# Patient Record
Sex: MALE | Race: NATIVE HAWAIIAN OR OTHER PACIFIC ISLANDER | NOT HISPANIC OR LATINO | URBAN - METROPOLITAN AREA
[De-identification: names, ages, dates, MRNs, and addresses within clinical notes are randomized per-mention and may not be internally consistent; named-entity substitution may affect disease eponyms.]

---

## 2019-01-29 ENCOUNTER — EMERGENCY (EMERGENCY)
Facility: HOSPITAL | Age: 64
LOS: 1 days | Discharge: HOME | End: 2019-01-29
Attending: EMERGENCY MEDICINE | Admitting: EMERGENCY MEDICINE
Payer: MEDICARE

## 2019-01-29 VITALS
TEMPERATURE: 98 F | DIASTOLIC BLOOD PRESSURE: 87 MMHG | HEART RATE: 88 BPM | RESPIRATION RATE: 18 BRPM | OXYGEN SATURATION: 95 % | SYSTOLIC BLOOD PRESSURE: 135 MMHG

## 2019-01-29 VITALS
SYSTOLIC BLOOD PRESSURE: 150 MMHG | TEMPERATURE: 97 F | DIASTOLIC BLOOD PRESSURE: 73 MMHG | RESPIRATION RATE: 20 BRPM | WEIGHT: 175.05 LBS | HEART RATE: 93 BPM | OXYGEN SATURATION: 94 %

## 2019-01-29 DIAGNOSIS — M79.89 OTHER SPECIFIED SOFT TISSUE DISORDERS: ICD-10-CM

## 2019-01-29 DIAGNOSIS — Z88.8 ALLERGY STATUS TO OTHER DRUGS, MEDICAMENTS AND BIOLOGICAL SUBSTANCES: ICD-10-CM

## 2019-01-29 DIAGNOSIS — J44.9 CHRONIC OBSTRUCTIVE PULMONARY DISEASE, UNSPECIFIED: ICD-10-CM

## 2019-01-29 DIAGNOSIS — Z87.891 PERSONAL HISTORY OF NICOTINE DEPENDENCE: ICD-10-CM

## 2019-01-29 DIAGNOSIS — R60.0 LOCALIZED EDEMA: ICD-10-CM

## 2019-01-29 LAB
ANION GAP SERPL CALC-SCNC: 10 MMOL/L — SIGNIFICANT CHANGE UP (ref 5–17)
APTT BLD: 32.8 SEC — SIGNIFICANT CHANGE UP (ref 27.5–36.3)
BASOPHILS NFR BLD AUTO: 0.6 % — SIGNIFICANT CHANGE UP (ref 0–2)
BUN SERPL-MCNC: 21 MG/DL — SIGNIFICANT CHANGE UP (ref 7–23)
CALCIUM SERPL-MCNC: 9.3 MG/DL — SIGNIFICANT CHANGE UP (ref 8.4–10.5)
CHLORIDE SERPL-SCNC: 109 MMOL/L — HIGH (ref 96–108)
CO2 SERPL-SCNC: 23 MMOL/L — SIGNIFICANT CHANGE UP (ref 22–31)
CREAT SERPL-MCNC: 0.83 MG/DL — SIGNIFICANT CHANGE UP (ref 0.5–1.3)
EOSINOPHIL NFR BLD AUTO: 2.4 % — SIGNIFICANT CHANGE UP (ref 0–6)
GLUCOSE SERPL-MCNC: 109 MG/DL — HIGH (ref 70–99)
HCT VFR BLD CALC: 34.6 % — LOW (ref 39–50)
HGB BLD-MCNC: 10.4 G/DL — LOW (ref 13–17)
INR BLD: 0.96 — SIGNIFICANT CHANGE UP (ref 0.88–1.16)
LYMPHOCYTES # BLD AUTO: 23 % — SIGNIFICANT CHANGE UP (ref 13–44)
MCHC RBC-ENTMCNC: 22 PG — LOW (ref 27–34)
MCHC RBC-ENTMCNC: 30.1 G/DL — LOW (ref 32–36)
MCV RBC AUTO: 73.2 FL — LOW (ref 80–100)
MONOCYTES NFR BLD AUTO: 8.4 % — SIGNIFICANT CHANGE UP (ref 2–14)
NEUTROPHILS NFR BLD AUTO: 65.6 % — SIGNIFICANT CHANGE UP (ref 43–77)
PLATELET # BLD AUTO: 310 K/UL — SIGNIFICANT CHANGE UP (ref 150–400)
POTASSIUM SERPL-MCNC: 4.6 MMOL/L — SIGNIFICANT CHANGE UP (ref 3.5–5.3)
POTASSIUM SERPL-SCNC: 4.6 MMOL/L — SIGNIFICANT CHANGE UP (ref 3.5–5.3)
PROTHROM AB SERPL-ACNC: 10.8 SEC — SIGNIFICANT CHANGE UP (ref 10–12.9)
RBC # BLD: 4.73 M/UL — SIGNIFICANT CHANGE UP (ref 4.2–5.8)
RBC # FLD: 20.5 % — HIGH (ref 10.3–16.9)
SODIUM SERPL-SCNC: 142 MMOL/L — SIGNIFICANT CHANGE UP (ref 135–145)
WBC # BLD: 7.9 K/UL — SIGNIFICANT CHANGE UP (ref 3.8–10.5)
WBC # FLD AUTO: 7.9 K/UL — SIGNIFICANT CHANGE UP (ref 3.8–10.5)

## 2019-01-29 PROCEDURE — 85025 COMPLETE CBC W/AUTO DIFF WBC: CPT

## 2019-01-29 PROCEDURE — 93970 EXTREMITY STUDY: CPT

## 2019-01-29 PROCEDURE — 93005 ELECTROCARDIOGRAM TRACING: CPT

## 2019-01-29 PROCEDURE — 85610 PROTHROMBIN TIME: CPT

## 2019-01-29 PROCEDURE — 99284 EMERGENCY DEPT VISIT MOD MDM: CPT | Mod: 25

## 2019-01-29 PROCEDURE — 93010 ELECTROCARDIOGRAM REPORT: CPT

## 2019-01-29 PROCEDURE — 71045 X-RAY EXAM CHEST 1 VIEW: CPT

## 2019-01-29 PROCEDURE — 80048 BASIC METABOLIC PNL TOTAL CA: CPT

## 2019-01-29 PROCEDURE — 36415 COLL VENOUS BLD VENIPUNCTURE: CPT

## 2019-01-29 PROCEDURE — 85730 THROMBOPLASTIN TIME PARTIAL: CPT

## 2019-01-29 PROCEDURE — 71045 X-RAY EXAM CHEST 1 VIEW: CPT | Mod: 26

## 2019-01-29 PROCEDURE — 93970 EXTREMITY STUDY: CPT | Mod: 26

## 2019-01-29 NOTE — ED ADULT NURSE NOTE - OBJECTIVE STATEMENT
64 y/o male c/o feet swelling beginning this morning. AOx3, presents with pain and +1 pitting edema present around bilateral ankles. Limited RoM in left foot. Takes dilaudid for chronic back pain, ambulates via wheelchair. Plan of care explained, will continue to monitor. 62 y/o male c/o feet swelling beginning this morning. AOx3, presents with pain and +1 pitting edema present around bilateral ankles. Hx PE, denies calf pain or recent travels. Limited RoM in left foot. Takes dilaudid for chronic back pain, ambulates via wheelchair. Plan of care explained, will continue to monitor.

## 2019-01-29 NOTE — ED PROVIDER NOTE - OBJECTIVE STATEMENT
Pt w/ PMHx COPD, PE /DVT s/p surgery (no longer on AC), multiple spinal surgeries (wheelchair bound - can walk at max 50 ft), neuromuscular disease. chronic pain on opiates, p/w leg swelling. No known hx CAD / CHF. NO CP, SOB, orthopnea, or PND. Pt w/ PMHx COPD, PE /DVT s/p surgery (no longer on AC), multiple spinal surgeries (wheelchair bound - can walk at max 50 ft), neuromuscular disease. chronic pain on opiates, p/w leg swelling. No leg pain. No rash. No f/c. No known hx CAD / CHF. NO CP, SOB, orthopnea, or PND. Pt's wife is an inpatient and he is staying in his room, in his wheelchair, which he reports he can recline in.

## 2019-01-29 NOTE — ED ADULT NURSE NOTE - NSIMPLEMENTINTERV_GEN_ALL_ED
Implemented All Fall with Harm Risk Interventions:  [place] to call system. Call bell, personal items and telephone within reach. Instruct patient to call for assistance. Room bathroom lighting operational. Non-slip footwear when patient is off stretcher. Physically safe environment: no spills, clutter or unnecessary equipment. Stretcher in lowest position, wheels locked, appropriate side rails in place. Provide visual cue, wrist band, yellow gown, etc. Monitor gait and stability. Monitor for mental status changes and reorient to person, place, and time. Review medications for side effects contributing to fall risk. Reinforce activity limits and safety measures with patient and family. Provide visual clues: red socks.

## 2019-01-29 NOTE — ED PROVIDER NOTE - PHYSICAL EXAMINATION
Constitutional: Well appearing, well nourished, awake, alert, oriented to person, place, time/situation and in no apparent distress.  ENMT: Airway patent. Normal MM  Eyes: Clear bilaterally  Cardiac: Normal rate, regular rhythm.  Heart sounds S1, S2.  No murmurs, rubs or gallops. No JVD   Respiratory: Breaths sounds equal and clear b/l. No increased WOB, tachypnea, hypoxia, or accessory mm use. Pt speaks in full sentences.   Gastrointestinal: Abd soft, NT, ND, NABS. No guarding, rebound, or rigidity. No pulsatile abdominal masses. No organomegaly appreciated. No CVAT   Musculoskeletal: Range of motion is not limited + b/l ankle edema, pitting. no calf ttp. 2+ pedal pulses b/l. motor / sensation intact. no erythema/ warmth / pallor  Neuro: Alert and oriented x 3, face symmetric and speech fluent. Strength 5/5 x 4 ext and symmetric, nml gross motor movement, nml gait. No focal deficits noted.  Skin: Skin normal color for race, warm, dry and intact. No evidence of rash.  Psych: Alert and oriented to person, place, time/situation. normal mood and affect. no apparent risk to self or others. Constitutional: Well appearing, well nourished, awake, alert, oriented to person, place, time/situation and in no apparent distress.  ENMT: Airway patent. Normal MM  Eyes: Clear bilaterally  Cardiac: Normal rate, regular rhythm.  Heart sounds S1, S2.  No murmurs, rubs or gallops. No JVD   Respiratory: Breaths sounds equal and clear b/l. No increased WOB, tachypnea, hypoxia, or accessory mm use. Pt speaks in full sentences.   Gastrointestinal: Abd soft, NT, ND, NABS. No guarding, rebound, or rigidity. No pulsatile abdominal masses. No organomegaly appreciated. No CVAT   Musculoskeletal: Range of motion is not limited + b/l ankle edema, pitting, symmetric. no leg edema. no calf ttp. 2+ pedal pulses b/l. motor / sensation intact. no erythema/ warmth / pallor  Neuro: Alert and oriented x 3, face symmetric and speech fluent. Strength 5/5 x 4 ext and symmetric, nml gross motor movement, nml gait. No focal deficits noted.  Skin: Skin normal color for race, warm, dry and intact. No evidence of rash.  Psych: Alert and oriented to person, place, time/situation. normal mood and affect. no apparent risk to self or others.

## 2019-01-29 NOTE — ED PROVIDER NOTE - NSFOLLOWUPINSTRUCTIONS_ED_ALL_ED_FT
Your blood work, xray of the chest, and ultrasound of the legs showed no acute abnormalities. Elevate your legs as much as possible.    Leg Edema    WHAT YOU NEED TO KNOW:    Leg edema is swelling caused by fluid buildup. Your legs may swell if you sit or stand for long periods of time, are pregnant, or are injured. Swelling may also occur if you have heart failure or circulation problems. This means that your heart does not pump blood through your body as it should.    DISCHARGE INSTRUCTIONS:    Self-care:     Elevate your legs: Raise your legs above the level of your heart as often as you can. This will help decrease swelling and pain. Prop your legs on pillows or blankets to keep them elevated comfortably.      Wear pressure stockings: These tight stockings put pressure on your legs to promote blood flow and prevent blood clots. Wear the stockings during the day. Do not wear them while you sleep.      Apply heat: Heat helps decrease pain and swelling. Apply heat on the area for 20 to 30 minutes every 2 hours for as many days as directed.       Stay active: Do not stand or sit for long periods of time. Ask your healthcare provider about the best exercise plan for you.      Eat healthy foods: Healthy foods include fruits, vegetables, whole-grain breads, low-fat dairy products, beans, lean meats, and fish. Ask if you need to be on a special diet. Limit salt. Salt will make your body hold even more fluid.    Follow up with your healthcare provider as directed: Write down your questions so you remember to ask them during your visits.     Contact your healthcare provider if:     You have a fever or feel more tired than usual.      The veins in your legs look larger than usual. They may look full or bulging.      Your legs itch or feel heavy.      You have red or white areas or sores on your legs. The skin may also appear dimpled or have indentations.      You are gaining weight.      You have trouble moving your ankles.      The swelling does not go away, or other parts of your body swell.      You have questions or concerns about your condition or care.    Return to the emergency department if:     You cannot walk.      You feel faint or confused.       Your skin turns blue or gray.      Your leg feels warm, tender, and painful. It may be swollen and red.      You have chest pain or trouble breathing that is worse when you lie down.      You suddenly feel lightheaded and have trouble breathing.      You have new and sudden chest pain. You may have more pain when you take deep breaths or cough. You may also cough up blood.         © Copyright [employer] 2019 All illustrations and images included in CareNotes are the copyrighted property of [employer] or [employer].      back to top                      © Copyright [employer] 2019

## 2019-01-29 NOTE — ED ADULT NURSE NOTE - CAS DISCH CONDITION
Detail Level: Simple Note Text (......Xxx Chief Complaint.): This diagnosis correlates with the Other (Free Text): Apply effudex in December Stable

## 2019-01-29 NOTE — ED PROVIDER NOTE - MEDICAL DECISION MAKING DETAILS
Pt p/w peripheral edema w/o other complaints. No sx decompensated HF. No cardiac hx. Legs symmetric. No trauma. No skin findings. NVI. Likely dependent edema. Prior hx PE/ DVT. Will check LE doppler, CXR, EKG. If w/u neg, anticipate d/c

## 2019-02-05 ENCOUNTER — INPATIENT (INPATIENT)
Facility: HOSPITAL | Age: 64
LOS: 1 days | Discharge: HOME | DRG: 872 | End: 2019-02-07
Payer: MEDICARE

## 2019-02-05 VITALS
SYSTOLIC BLOOD PRESSURE: 167 MMHG | HEART RATE: 98 BPM | OXYGEN SATURATION: 97 % | RESPIRATION RATE: 18 BRPM | TEMPERATURE: 101 F | WEIGHT: 173.06 LBS | DIASTOLIC BLOOD PRESSURE: 82 MMHG

## 2019-02-05 DIAGNOSIS — I27.82 CHRONIC PULMONARY EMBOLISM: ICD-10-CM

## 2019-02-05 DIAGNOSIS — G89.29 OTHER CHRONIC PAIN: ICD-10-CM

## 2019-02-05 DIAGNOSIS — Z98.890 OTHER SPECIFIED POSTPROCEDURAL STATES: Chronic | ICD-10-CM

## 2019-02-05 DIAGNOSIS — Z90.49 ACQUIRED ABSENCE OF OTHER SPECIFIED PARTS OF DIGESTIVE TRACT: Chronic | ICD-10-CM

## 2019-02-05 DIAGNOSIS — R63.8 OTHER SYMPTOMS AND SIGNS CONCERNING FOOD AND FLUID INTAKE: ICD-10-CM

## 2019-02-05 DIAGNOSIS — R65.10 SYSTEMIC INFLAMMATORY RESPONSE SYNDROME (SIRS) OF NON-INFECTIOUS ORIGIN WITHOUT ACUTE ORGAN DYSFUNCTION: ICD-10-CM

## 2019-02-05 DIAGNOSIS — R60.0 LOCALIZED EDEMA: ICD-10-CM

## 2019-02-05 DIAGNOSIS — F32.9 MAJOR DEPRESSIVE DISORDER, SINGLE EPISODE, UNSPECIFIED: ICD-10-CM

## 2019-02-05 DIAGNOSIS — Z91.89 OTHER SPECIFIED PERSONAL RISK FACTORS, NOT ELSEWHERE CLASSIFIED: ICD-10-CM

## 2019-02-05 DIAGNOSIS — J44.9 CHRONIC OBSTRUCTIVE PULMONARY DISEASE, UNSPECIFIED: ICD-10-CM

## 2019-02-05 DIAGNOSIS — N31.9 NEUROMUSCULAR DYSFUNCTION OF BLADDER, UNSPECIFIED: ICD-10-CM

## 2019-02-05 DIAGNOSIS — G47.31 PRIMARY CENTRAL SLEEP APNEA: ICD-10-CM

## 2019-02-05 DIAGNOSIS — D64.9 ANEMIA, UNSPECIFIED: ICD-10-CM

## 2019-02-05 DIAGNOSIS — Z29.9 ENCOUNTER FOR PROPHYLACTIC MEASURES, UNSPECIFIED: ICD-10-CM

## 2019-02-05 PROBLEM — I26.99 OTHER PULMONARY EMBOLISM WITHOUT ACUTE COR PULMONALE: Chronic | Status: ACTIVE | Noted: 2019-01-29

## 2019-02-05 LAB
ALBUMIN SERPL ELPH-MCNC: 4.1 G/DL — SIGNIFICANT CHANGE UP (ref 3.3–5)
ALP SERPL-CCNC: 72 U/L — SIGNIFICANT CHANGE UP (ref 40–120)
ALT FLD-CCNC: 18 U/L — SIGNIFICANT CHANGE UP (ref 10–45)
ANION GAP SERPL CALC-SCNC: 10 MMOL/L — SIGNIFICANT CHANGE UP (ref 5–17)
APPEARANCE UR: CLEAR — SIGNIFICANT CHANGE UP
AST SERPL-CCNC: 25 U/L — SIGNIFICANT CHANGE UP (ref 10–40)
BASOPHILS NFR BLD AUTO: 0.5 % — SIGNIFICANT CHANGE UP (ref 0–2)
BILIRUB SERPL-MCNC: 0.2 MG/DL — SIGNIFICANT CHANGE UP (ref 0.2–1.2)
BILIRUB UR-MCNC: NEGATIVE — SIGNIFICANT CHANGE UP
BUN SERPL-MCNC: 14 MG/DL — SIGNIFICANT CHANGE UP (ref 7–23)
CALCIUM SERPL-MCNC: 9.7 MG/DL — SIGNIFICANT CHANGE UP (ref 8.4–10.5)
CHLORIDE SERPL-SCNC: 103 MMOL/L — SIGNIFICANT CHANGE UP (ref 96–108)
CO2 SERPL-SCNC: 27 MMOL/L — SIGNIFICANT CHANGE UP (ref 22–31)
COLOR SPEC: YELLOW — SIGNIFICANT CHANGE UP
CREAT SERPL-MCNC: 0.96 MG/DL — SIGNIFICANT CHANGE UP (ref 0.5–1.3)
D DIMER BLD IA.RAPID-MCNC: 577 NG/ML DDU — HIGH
DIFF PNL FLD: NEGATIVE — SIGNIFICANT CHANGE UP
EOSINOPHIL NFR BLD AUTO: 0.2 % — SIGNIFICANT CHANGE UP (ref 0–6)
GLUCOSE SERPL-MCNC: 121 MG/DL — HIGH (ref 70–99)
GLUCOSE UR QL: NEGATIVE — SIGNIFICANT CHANGE UP
HCT VFR BLD CALC: 35.8 % — LOW (ref 39–50)
HGB BLD-MCNC: 10.7 G/DL — LOW (ref 13–17)
KETONES UR-MCNC: NEGATIVE — SIGNIFICANT CHANGE UP
LACTATE SERPL-SCNC: 1.6 MMOL/L — SIGNIFICANT CHANGE UP (ref 0.5–2)
LEUKOCYTE ESTERASE UR-ACNC: NEGATIVE — SIGNIFICANT CHANGE UP
LYMPHOCYTES # BLD AUTO: 9.3 % — LOW (ref 13–44)
MCHC RBC-ENTMCNC: 21.4 PG — LOW (ref 27–34)
MCHC RBC-ENTMCNC: 29.9 G/DL — LOW (ref 32–36)
MCV RBC AUTO: 71.7 FL — LOW (ref 80–100)
MONOCYTES NFR BLD AUTO: 9.1 % — SIGNIFICANT CHANGE UP (ref 2–14)
NEUTROPHILS NFR BLD AUTO: 80.9 % — HIGH (ref 43–77)
NITRITE UR-MCNC: NEGATIVE — SIGNIFICANT CHANGE UP
PH UR: 6.5 — SIGNIFICANT CHANGE UP (ref 5–8)
PLATELET # BLD AUTO: 220 K/UL — SIGNIFICANT CHANGE UP (ref 150–400)
POTASSIUM SERPL-MCNC: 4.5 MMOL/L — SIGNIFICANT CHANGE UP (ref 3.5–5.3)
POTASSIUM SERPL-SCNC: 4.5 MMOL/L — SIGNIFICANT CHANGE UP (ref 3.5–5.3)
PROT SERPL-MCNC: 7.3 G/DL — SIGNIFICANT CHANGE UP (ref 6–8.3)
PROT UR-MCNC: NEGATIVE MG/DL — SIGNIFICANT CHANGE UP
RAPID RVP RESULT: SIGNIFICANT CHANGE UP
RBC # BLD: 4.99 M/UL — SIGNIFICANT CHANGE UP (ref 4.2–5.8)
RBC # FLD: 19.9 % — HIGH (ref 10.3–16.9)
SODIUM SERPL-SCNC: 140 MMOL/L — SIGNIFICANT CHANGE UP (ref 135–145)
SP GR SPEC: 1.02 — SIGNIFICANT CHANGE UP (ref 1–1.03)
UROBILINOGEN FLD QL: 0.2 E.U./DL — SIGNIFICANT CHANGE UP
WBC # BLD: 6.6 K/UL — SIGNIFICANT CHANGE UP (ref 3.8–10.5)
WBC # FLD AUTO: 6.6 K/UL — SIGNIFICANT CHANGE UP (ref 3.8–10.5)

## 2019-02-05 PROCEDURE — 99285 EMERGENCY DEPT VISIT HI MDM: CPT

## 2019-02-05 PROCEDURE — 73600 X-RAY EXAM OF ANKLE: CPT | Mod: 26,LT

## 2019-02-05 PROCEDURE — 73620 X-RAY EXAM OF FOOT: CPT | Mod: 26,LT

## 2019-02-05 PROCEDURE — 93010 ELECTROCARDIOGRAM REPORT: CPT

## 2019-02-05 PROCEDURE — 71046 X-RAY EXAM CHEST 2 VIEWS: CPT | Mod: 26

## 2019-02-05 PROCEDURE — 99223 1ST HOSP IP/OBS HIGH 75: CPT | Mod: GC

## 2019-02-05 RX ORDER — PYRIDOSTIGMINE BROMIDE 60 MG/5ML
60 SOLUTION ORAL THREE TIMES A DAY
Qty: 0 | Refills: 0 | Status: DISCONTINUED | OUTPATIENT
Start: 2019-02-05 | End: 2019-02-07

## 2019-02-05 RX ORDER — HYDROXYZINE HCL 10 MG
25 TABLET ORAL
Qty: 0 | Refills: 0 | Status: DISCONTINUED | OUTPATIENT
Start: 2019-02-05 | End: 2019-02-07

## 2019-02-05 RX ORDER — TRAZODONE HCL 50 MG
100 TABLET ORAL AT BEDTIME
Qty: 0 | Refills: 0 | Status: DISCONTINUED | OUTPATIENT
Start: 2019-02-05 | End: 2019-02-07

## 2019-02-05 RX ORDER — HYDROMORPHONE HYDROCHLORIDE 2 MG/ML
4 INJECTION INTRAMUSCULAR; INTRAVENOUS; SUBCUTANEOUS EVERY 6 HOURS
Qty: 0 | Refills: 0 | Status: DISCONTINUED | OUTPATIENT
Start: 2019-02-05 | End: 2019-02-07

## 2019-02-05 RX ORDER — HYDROXYZINE HCL 10 MG
100 TABLET ORAL THREE TIMES A DAY
Qty: 0 | Refills: 0 | Status: DISCONTINUED | OUTPATIENT
Start: 2019-02-05 | End: 2019-02-05

## 2019-02-05 RX ORDER — MORPHINE SULFATE 50 MG/1
30 CAPSULE, EXTENDED RELEASE ORAL EVERY 12 HOURS
Qty: 0 | Refills: 0 | Status: DISCONTINUED | OUTPATIENT
Start: 2019-02-05 | End: 2019-02-07

## 2019-02-05 RX ORDER — PANTOPRAZOLE SODIUM 20 MG/1
40 TABLET, DELAYED RELEASE ORAL
Qty: 0 | Refills: 0 | Status: DISCONTINUED | OUTPATIENT
Start: 2019-02-05 | End: 2019-02-05

## 2019-02-05 RX ORDER — PANTOPRAZOLE SODIUM 20 MG/1
40 TABLET, DELAYED RELEASE ORAL AT BEDTIME
Qty: 0 | Refills: 0 | Status: DISCONTINUED | OUTPATIENT
Start: 2019-02-05 | End: 2019-02-07

## 2019-02-05 RX ORDER — BUDESONIDE AND FORMOTEROL FUMARATE DIHYDRATE 160; 4.5 UG/1; UG/1
2 AEROSOL RESPIRATORY (INHALATION)
Qty: 0 | Refills: 0 | Status: DISCONTINUED | OUTPATIENT
Start: 2019-02-05 | End: 2019-02-07

## 2019-02-05 RX ORDER — HYDROMORPHONE HYDROCHLORIDE 2 MG/ML
4 INJECTION INTRAMUSCULAR; INTRAVENOUS; SUBCUTANEOUS ONCE
Qty: 0 | Refills: 0 | Status: DISCONTINUED | OUTPATIENT
Start: 2019-02-05 | End: 2019-02-05

## 2019-02-05 RX ORDER — FLUTICASONE PROPIONATE 50 MCG
1 SPRAY, SUSPENSION NASAL
Qty: 0 | Refills: 0 | Status: DISCONTINUED | OUTPATIENT
Start: 2019-02-05 | End: 2019-02-07

## 2019-02-05 RX ORDER — IBUPROFEN 200 MG
600 TABLET ORAL ONCE
Qty: 0 | Refills: 0 | Status: COMPLETED | OUTPATIENT
Start: 2019-02-05 | End: 2019-02-05

## 2019-02-05 RX ORDER — DIAZEPAM 5 MG
5 TABLET ORAL THREE TIMES A DAY
Qty: 0 | Refills: 0 | Status: DISCONTINUED | OUTPATIENT
Start: 2019-02-05 | End: 2019-02-07

## 2019-02-05 RX ORDER — HYDROXYZINE HCL 10 MG
100 TABLET ORAL THREE TIMES A DAY
Qty: 0 | Refills: 0 | Status: DISCONTINUED | OUTPATIENT
Start: 2019-02-05 | End: 2019-02-07

## 2019-02-05 RX ORDER — CLONAZEPAM 1 MG
1.5 TABLET ORAL DAILY
Qty: 0 | Refills: 0 | Status: DISCONTINUED | OUTPATIENT
Start: 2019-02-05 | End: 2019-02-07

## 2019-02-05 RX ORDER — HEPARIN SODIUM 5000 [USP'U]/ML
5000 INJECTION INTRAVENOUS; SUBCUTANEOUS EVERY 8 HOURS
Qty: 0 | Refills: 0 | Status: DISCONTINUED | OUTPATIENT
Start: 2019-02-05 | End: 2019-02-07

## 2019-02-05 RX ORDER — SODIUM CHLORIDE 9 MG/ML
1000 INJECTION INTRAMUSCULAR; INTRAVENOUS; SUBCUTANEOUS ONCE
Qty: 0 | Refills: 0 | Status: COMPLETED | OUTPATIENT
Start: 2019-02-05 | End: 2019-02-05

## 2019-02-05 RX ORDER — CEFTRIAXONE 500 MG/1
1 INJECTION, POWDER, FOR SOLUTION INTRAMUSCULAR; INTRAVENOUS EVERY 24 HOURS
Qty: 0 | Refills: 0 | Status: DISCONTINUED | OUTPATIENT
Start: 2019-02-05 | End: 2019-02-05

## 2019-02-05 RX ORDER — ACETAMINOPHEN 500 MG
975 TABLET ORAL ONCE
Qty: 0 | Refills: 0 | Status: COMPLETED | OUTPATIENT
Start: 2019-02-05 | End: 2019-02-05

## 2019-02-05 RX ADMIN — CEFTRIAXONE 1 GRAM(S): 500 INJECTION, POWDER, FOR SOLUTION INTRAMUSCULAR; INTRAVENOUS at 14:00

## 2019-02-05 RX ADMIN — Medication 975 MILLIGRAM(S): at 12:30

## 2019-02-05 RX ADMIN — Medication 5 MILLIGRAM(S): at 22:20

## 2019-02-05 RX ADMIN — SODIUM CHLORIDE 3000 MILLILITER(S): 9 INJECTION INTRAMUSCULAR; INTRAVENOUS; SUBCUTANEOUS at 13:38

## 2019-02-05 RX ADMIN — CEFTRIAXONE 100 GRAM(S): 500 INJECTION, POWDER, FOR SOLUTION INTRAMUSCULAR; INTRAVENOUS at 13:56

## 2019-02-05 RX ADMIN — Medication 100 MILLIGRAM(S): at 22:20

## 2019-02-05 RX ADMIN — Medication 975 MILLIGRAM(S): at 15:40

## 2019-02-05 RX ADMIN — PANTOPRAZOLE SODIUM 40 MILLIGRAM(S): 20 TABLET, DELAYED RELEASE ORAL at 22:20

## 2019-02-05 RX ADMIN — HEPARIN SODIUM 5000 UNIT(S): 5000 INJECTION INTRAVENOUS; SUBCUTANEOUS at 23:09

## 2019-02-05 RX ADMIN — HYDROMORPHONE HYDROCHLORIDE 4 MILLIGRAM(S): 2 INJECTION INTRAMUSCULAR; INTRAVENOUS; SUBCUTANEOUS at 18:30

## 2019-02-05 RX ADMIN — Medication 600 MILLIGRAM(S): at 13:56

## 2019-02-05 RX ADMIN — PYRIDOSTIGMINE BROMIDE 60 MILLIGRAM(S): 60 SOLUTION ORAL at 22:36

## 2019-02-05 RX ADMIN — Medication 1.5 MILLIGRAM(S): at 22:21

## 2019-02-05 RX ADMIN — Medication 600 MILLIGRAM(S): at 14:35

## 2019-02-05 RX ADMIN — SODIUM CHLORIDE 1000 MILLILITER(S): 9 INJECTION INTRAMUSCULAR; INTRAVENOUS; SUBCUTANEOUS at 15:48

## 2019-02-05 NOTE — ED PROVIDER NOTE - ATTENDING CONTRIBUTION TO CARE
Medical Student Attestation:    64 yo M wheelchair bound due to prior TBI, COPD on prednisone daily, presenting with fever, chills x 1 day w/o other symptoms.  Denies n/v/d/c, ab pain, headache, sore throat, congestion, cough, sob, chest pain, rectal pain or back pain.  + mild skin changes to LLE but no major pain.  Pt well, nad, VS low grade fever, 90s HR, BP stable.  Nl HEENT exam, no meningeal signs, clear lungs, no murmurs, belly soft nontender, no ext genitalia, slightly red L foot, no joint involvement or ttp, + FROM L ankle nl pulses.   DDx includes sepsis due to flu, cellulitis, uti.  Do not suspect septic joint.  Plan labs, cultures, ivf, antipyretics, abx, urine, cxr and reassess.

## 2019-02-05 NOTE — H&P ADULT - NSHPLABSRESULTS_GEN_ALL_CORE
.  LABS:                         10.7   6.6   )-----------( 220      ( 2019 13:03 )             35.8         140  |  103  |  14  ----------------------------<  121<H>  4.5   |  27  |  0.96    Ca    9.7      2019 12:32    TPro  7.3  /  Alb  4.1  /  TBili  0.2  /  DBili  x   /  AST  25  /  ALT  18  /  AlkPhos  72        Urinalysis Basic - ( 2019 16:11 )    Color: Yellow / Appearance: Clear / S.020 / pH: x  Gluc: x / Ketone: NEGATIVE  / Bili: Negative / Urobili: 0.2 E.U./dL   Blood: x / Protein: NEGATIVE mg/dL / Nitrite: NEGATIVE   Leuk Esterase: NEGATIVE / RBC: x / WBC x   Sq Epi: x / Non Sq Epi: x / Bacteria: x            Lactate, Blood: 1.6 mmoL/L ( @ 12:32)      RADIOLOGY, EKG & ADDITIONAL TESTS: Reviewed.

## 2019-02-05 NOTE — H&P ADULT - PROBLEM SELECTOR PLAN 2
Pt with history of 9 back surgeries and b/l foot surgeries after traumatic accident in 1991. On extensive pain medication regimen. Given dilaudid 4mg in ED for back pain.  - Medication reconciliation  - Obtain collateral from PCP Pt with history of 9 back surgeries and b/l foot surgeries after traumatic accident in 1991. On extensive pain medication regimen. Given dilaudid 4mg in ED for back pain.  - C/w home meds: Lyrica 50mg BID, Dilaudid 4mg TID prn for severe pain  - Obtain collateral from PCP Pt with history of 9 back surgeries and b/l foot surgeries after traumatic accident in 1991. On extensive pain medication regimen. Given dilaudid 4mg in ED for back pain.  - C/w home meds: Lyrica 50mg BID, Dilaudid 4mg TID prn for severe pain, clonazepam 1.5mg daily prn, hydroxyzine 100mg TID standing, [id number] BID prn; diazepam 5mg TID prn  - Obtain collateral from PCP Pt+ wife reports history of DVT/PE that was perioperative in 1991 for which he was previously on AC. Currently not on any AC  - F/u D-dimer in setting of LLE swelling  - Consider repeat LLE doppler vs CT PE protocol pending d-dimer #LLE swelling  - Pt with LLE pitting edema to ankle on exam. Had b/l LE dopplers on 1/29 on ED visit negative for DVT and per pt edema is reduced since this time, currently not erythematous, warm or tender on exam. May be dependent edema in setting of pt sleeping in his wheelchair x several days  - Elevation of LLE  - Continue to monitor  - F/u D-dimer  - Consider LLE doppler vs CT PE protocol pending D-dimer  - Monitor respiratory status (currently without tachypnea with O2 sats 92-97% on RA).    Pt+ wife reports history of DVT/PE that was perioperative in 1991 for which he was previously on AC. Currently not on any AC  - F/u D-dimer in setting of LLE swelling  - Consider repeat LLE doppler vs CT PE protocol pending d-dimer    ADDENDUM: ordered xrays of left ankle and foot; check ESRP/ CRP

## 2019-02-05 NOTE — ED PROVIDER NOTE - MUSCULOSKELETAL MINIMAL EXAM
L ankle erythema, nonpitting edema, warm to touch, minimally tender to palpation. R ankle no swelling, or erythema. DP, PT pulses 2+ b/l/normal range of motion normal range of motion/L ankle hyperemia, nonpitting edema, warm to touch, minimally tender to palpation, with full range of motion. R ankle no swelling, or erythema. DP, PT pulses 2+ b/l

## 2019-02-05 NOTE — H&P ADULT - PMH
Chronic obstructive pulmonary disease, unspecified COPD type    Pulmonary embolism    TBI (traumatic brain injury)

## 2019-02-05 NOTE — H&P ADULT - PROBLEM SELECTOR PLAN 5
Pt with hx of neurogenic bladder since his multiple spinal surgeries, he self-catheterizes q6h at home. UA negative.  - C/w straight cath q6h    #Neurogenic spasticity  Pt takes klonopin 1.5 mg daily and 20 mg valium bid prn for spasticity at home    #Esophageal dysplasia  Pt reports hx of esophageal dysplasia for which he undergoes regular endoscopy, on protonix 10mg BID at home  - C/w protonix Pt with hx of neurogenic bladder since his multiple spinal surgeries, he self-catheterizes q6h at home. UA negative.  - C/w straight cath q6h    #Charcot Delia Tooth  Pt with history of neurogenic spasticity and dystonia which developed after his spinal surgeries, undergoing extensive workup at [facility]. Pt takes pyridostigmine 60mg TID, diazepam 5mg 1-2 tabs TID prn    #Esophageal dysplasia  Pt reports hx of esophageal dysplasia for which he undergoes regular endoscopy, on dexilant 60mg daily  - Conversion to protonix Pt states he has history of both central and obstructive sleep apnea for which he uses a specialized CPAP machine (ASV) at home. While in the hospital the mask that goes with his machine was thrown away.  - Obtain mask for CPAP machine  - CPAP at night

## 2019-02-05 NOTE — ED PROVIDER NOTE - MEDICAL DECISION MAKING DETAILS
64 y/o M w/PMH of COPD, PE/DVT, TBI s/p multiple spinal surgeries (wheelchair bound can walk about 50 ft) presenting with fever x1day and swollen L ankle. Likely viral, R/O other source of infection including pulmonary given cough and hx of COPD, and urinary given neurogenic bladder. Unlikely septic joint. Plan: CBC, CMP, CXR, UA, blood cultures, Tylenol for fever. 64 y/o M w/PMH of COPD, PE/DVT, TBI s/p multiple spinal surgeries (wheelchair bound can walk about 50 ft) presenting with fever x1day and swollen L ankle. Likely viral, R/O other source of infection including pulmonary given cough and hx of COPD, and urinary given neurogenic bladder. Unlikely septic joint. Plan: CBC, CMP, CXR, UA, blood cultures, RVP, Tylenol for fever. 64 y/o M w/PMH of COPD, PE/DVT, TBI s/p multiple spinal surgeries (wheelchair bound can walk about 50 ft) presenting with fever x1day and swollen L ankle. Likely viral, R/O other source of infection including pulmonary given cough and hx of COPD, and urinary given neurogenic bladder. Unlikely infected joint, mildly hyperemic and with full range of motion.   Plan: CBC, CMP, CXR, UA, blood cultures, RVP, Tylenol for fever. 62 y/o M w/PMH of COPD, PE/DVT, TBI s/p multiple spinal surgeries (wheelchair bound can walk about 50 ft) presenting with fever x1day and swollen L ankle. Likely viral, R/O other source of infection including pulmonary given cough and hx of COPD, and urinary given neurogenic bladder. Unlikely infected joint, mildly hyperemic and with full range of motion. Plan: CBC, CMP, CXR, UA, blood cultures, RVP, Tylenol for fever.

## 2019-02-05 NOTE — ED PROVIDER NOTE - CARE PLAN
Principal Discharge DX:	Sepsis, due to unspecified organism  Secondary Diagnosis:	Cellulitis of left lower extremity

## 2019-02-05 NOTE — H&P ADULT - PROBLEM SELECTOR PROBLEM 4
Central sleep apnea Chronic pulmonary embolism without acute cor pulmonale, unspecified pulmonary embolism type Chronic obstructive pulmonary disease, unspecified COPD type

## 2019-02-05 NOTE — H&P ADULT - PROBLEM SELECTOR PLAN 10
1) PCP Contacted on Admission: (Y/N) --> Name & Phone #: Dr Sushma Cottrell (PCP) [phone] ([place], [place])  2) Date of Contact with PCP:  3) PCP Contacted at Discharge: (Y/N, N/A)  4) Summary of Handoff Given to PCP:   5) Post-Discharge Appointment Date and Location: 1) PCP Contacted on Admission: (N) --> Name & Phone #: Dr Sushma Cottrell (PCP) [phone] ([place], [place])  2) Date of Contact with PCP:  3) PCP Contacted at Discharge: (Y/N, N/A)  4) Summary of Handoff Given to PCP:   5) Post-Discharge Appointment Date and Location:

## 2019-02-05 NOTE — H&P ADULT - PROBLEM SELECTOR PLAN 8
DVT PPX: HSQ, SCDs F: No IVF  E: Replete to maintain K>4 and Mg>2  N: Regular diet Pt reports history of depression for which he takes [id number] nightly  - C/w trazodone 100mg at night

## 2019-02-05 NOTE — H&P ADULT - NSHPPHYSICALEXAM_GEN_ALL_CORE
.  VITAL SIGNS:  T(C): 37.2 (02-05-19 @ 18:29), Max: 39.8 (02-05-19 @ 12:31)  T(F): 99 (02-05-19 @ 18:29), Max: 103.7 (02-05-19 @ 12:31)  HR: 75 (02-05-19 @ 18:29) (75 - 98)  BP: 140/72 (02-05-19 @ 18:29) (119/68 - 167/82)  BP(mean): --  RR: 20 (02-05-19 @ 18:29) (18 - 20)  SpO2: 96% (02-05-19 @ 18:29) (92% - 97%)  Wt(kg): --    PHYSICAL EXAM:    Constitutional: WDWN male, appears older than stated age, resting comfortably in bed; NAD  Head: NC/AT  Eyes: PERRL, EOMI, clear conjunctiva  ENT: no nasal discharge; uvula midline, no oropharyngeal erythema or exudates; MMM  Neck: supple; no JVD or thyromegaly  Respiratory: CTA B/L; no W/R/R, no retractions  Cardiac: +S1/S2; RRR; no M/R/G; PMI non-displaced  Gastrointestinal: soft, NT/ND; no rebound or guarding; +BSx4  Back: spine midline, no bony tenderness or step-offs; no CVAT B/L  Extremities: WWP, +L ankle and foot with 2+ pitting edema, no erythema or warmth, non tender to palpation; no clubbing or cyanosis  Musculoskeletal: NROM x4; no joint swelling, tenderness or erythema  Vascular: 2+ radial, femoral, DP/PT pulses B/L  Dermatologic: skin warm, dry and intact; no rashes, wounds, or scars  Lymphatic: no submandibular or cervical LAD  Neurologic: AAOx3; however speech is slowed; CNII-XII grossly intact; no focal deficits  Psychiatric: affect and characteristics of appearance, verbalizations, behaviors are appropriate

## 2019-02-05 NOTE — ED PROVIDER NOTE - PMH
Pulmonary embolism Chronic obstructive pulmonary disease, unspecified COPD type    Pulmonary embolism    TBI (traumatic brain injury)

## 2019-02-05 NOTE — H&P ADULT - ATTENDING COMMENTS
patient seen and examined    reviewed data including:  labs, available radiological reports/ studies, ekg, previous chart notes and/or imaging      agree w/ PE findings as above w/ additions/ exceptions/ pertinent findings:     1.  2.   3.       rest of plan as above patient seen and examined; reports chills     reviewed data including:  labs, available radiological reports/ studies, ekg, previous chart notes and/or imaging      agree w/ PE findings as above w/ additions/ exceptions/ pertinent findings:     1.  2.   3.       rest of plan as above patient seen and examined; reports chills x 1 day followed by fevers on day of admission; pt visiting from [place] for orthopedic procedure for patient's wife. In addition to above medications that pt brought with him, pt reports being on morphine sulfate ER 30mg ; outside med sources also list pt being carbidopa/ levodopa , nortriptyline, among others; pt was on bactrim ds 2 weeks ago for COPD exacerbation     reviewed data including:  labs, available radiological reports/ studies, ekg, previous chart notes and/or imaging      agree w/ PE findings as above w/ additions/ exceptions/ pertinent findings: elderly male, in NAD, appears old than stated age, MMM, no JVD, s1s2, lungs cta b/l, ; no spinal tenderness b/l;  +surgical scars noted on back ; abdomen soft /nt/nd; no lower ext edema b/l except for left ankle and foot +2 pitting edema, Left foot appears more erythematous but nontender; +2 DP pulses b/l     1. SIRS: source unclear, monitor overnight, agree w/ holding further abx ; D-dimer elevated, given decreased mobility (uses motorized wheelchair), left foot swelling, tachycardia, pox decreasing at times to low 90s and vague presenting sxs, ordered for CT angio chest; follow up ctxs; may need gallium scan if source of fever not found.     2. left foot swelling: nontender, will check xrays, ESR/ CRP, keep left leg elevated, monitor for worsening sxs, further imaging of left ankle/ foot if warranted     3. Unable to check ISTOP as pt is from [place]; however outside med sources lists morpine sulfate ER as rxd medication; will need confirmation of ALL current medications with pharmacy in AM       rest of plan as above; extensive coordination of plan for patient with nursing and housestaff overnight

## 2019-02-05 NOTE — H&P ADULT - PROBLEM SELECTOR PLAN 6
Pt reports history of depression for which he takes thorazine 100mg nightly  - C/w thorazine Pt with hx of neurogenic bladder since his multiple spinal surgeries, he self-catheterizes q6h at home. UA negative.  - C/w straight cath q6h    #Charcot Delia Tooth  Pt with history of neurogenic spasticity and dystonia which developed after his spinal surgeries, undergoing extensive workup at [facility] [place]. Pt takes pyridostigmine 60mg TID, diazepam 5mg 1-2 tabs TID prn, clonopin 1.5 mg daily prn, hydroxyzine 100mg TID standing , hydroxyzine 25mg BID prn    #Esophageal dysplasia  Pt reports hx of esophageal dysplasia for which he undergoes regular endoscopy, on dexilant 60mg daily  - Conversion to protonix

## 2019-02-05 NOTE — H&P ADULT - PROBLEM SELECTOR PROBLEM 3
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

## 2019-02-05 NOTE — H&P ADULT - PROBLEM SELECTOR PROBLEM 8
Need for prophylactic measure Nutrition, metabolism, and development symptoms Depression, unspecified depression type

## 2019-02-05 NOTE — H&P ADULT - PROBLEM SELECTOR PLAN 7
F: No IVF  E: Replete to maintain K>4 and Mg>2  N: Regular diet Pt reports history of depression for which he takes [id number] nightly  - C/w trazodone 100mg at night monitor CBC, check iron studies microcytic; monitor CBC, check iron studies; followup w/ outpatient GI

## 2019-02-05 NOTE — H&P ADULT - HISTORY OF PRESENT ILLNESS
63M with PMH of TBI and multiple spinal/orthopedic surgeries due to an agricultural accident in 1991 with resultant chronic pain, neurogenic bladder (requiring self catheterization 4x/day), neurogenic spasticity, COPD, DVT/PE, central and obstructive sleep apnea, esophageal dysplasia (grade 4), Charcot Delia Tooth, depression, insomnia presenting with 2 days history of fever and chills. Pt states his wife is admitted to [facility] for surgery and he has been staying in the hospital with her the past several days when he began to "not feel well", he checked his temperature at was reportedly 103F. Of note pt recently seen in the ED on 1/29 for LLE swelling, doppler at that time was negative. Otherwise patient has no other associated symptoms. Denies cough, rhinorrhea, sore throat, nausea, vomiting, abdominal pain, diarrhea, rigors, chest, pain, SOB, dysuria, rashes.     ED Course: VS T 100.3--> 100.6, HR 98, /82, RR 18, O2 sat 97% on RA. Pt given 1L NS bolus. Pt given 1g of CTX. Labs s/f microcytic anemia 10.7/35.8, no WBC count but neutrophilic predominance 80.9%. BMP wnl. Lactate 1.6. UA negative. BCx x2 sets sent. CXR with no acute infiltrate. 63M with PMH of TBI and multiple spinal/orthopedic surgeries due to an agricultural accident in 1991 with resultant chronic pain, neurogenic bladder (requiring self catheterization 4x/day), neurogenic spasticity, COPD, PE (developed perioperatively), central and obstructive sleep apnea, esophageal dysplasia (grade 4), Charcot Delia Tooth, depression, insomnia presenting with 2 days history of fever and chills. Pt states his wife is admitted to [facility] for surgery and he has been staying in the hospital with her the past several days when he began to "not feel well", he checked his temperature at was reportedly 103F. Of note pt recently seen in the ED on 1/29 for LLE swelling, doppler at that time was negative. Otherwise patient has no other associated symptoms. Denies cough, rhinorrhea, sore throat, nausea, vomiting, abdominal pain, diarrhea, rigors, chest, pain, SOB, dysuria, rashes.     ED Course: VS T 100.3--> 100.6, HR 98, /82, RR 18, O2 sat 97% on RA. Pt given 1L NS bolus. Pt given 1g of CTX. Labs s/f microcytic anemia 10.7/35.8, no WBC count but neutrophilic predominance 80.9%. BMP wnl. Lactate 1.6. UA negative. BCx x2 sets sent. CXR with no acute infiltrate. 63M with PMH of TBI and multiple spinal/orthopedic surgeries due to an agricultural accident in 1991 with resultant chronic pain, neurogenic bladder (requiring self catheterization 4x/day), neurogenic spasticity, COPD, PE (developed perioperatively in 1991, not currently on AC), central and obstructive sleep apnea, esophageal dysplasia (grade 4), Charcot Delia Tooth, depression, insomnia presenting with 2 days history of fever and chills. Pt states his wife is admitted to [facility] for surgery and he has been staying in the hospital with her the past several days when he began to "not feel well", he checked his temperature at was reportedly 103F. Of note pt recently seen in the ED on 1/29 for LLE swelling, doppler at that time was negative. Otherwise patient has no other associated symptoms. Denies cough, rhinorrhea, sore throat, nausea, vomiting, abdominal pain, diarrhea, rigors, chest, pain, SOB, dysuria, rashes.     ED Course: VS T 100.3--> 100.6, HR 98, /82, RR 18, O2 sat 97% on RA. Pt given 1L NS bolus. Pt given 1g of CTX. Labs s/f microcytic anemia 10.7/35.8, no WBC count but neutrophilic predominance 80.9%. BMP wnl. Lactate 1.6. UA negative. BCx x2 sets sent. CXR with no acute infiltrate.

## 2019-02-05 NOTE — H&P ADULT - NSHPSOCIALHISTORY_GEN_ALL_CORE
30+ pack year smoking history. Social ETOH use. Denies other illicit drug use. 30+ pack year smoking history. Social ETOH use. Denies other illicit drug use. lives with wife in [place], sexually active only w/ wife.

## 2019-02-05 NOTE — H&P ADULT - PROBLEM SELECTOR PLAN 3
Pt with history of COPD for which he follows with a pulmonologist in [place] (Dr Bird at [facility])  - C/w home medications (advair, ellipta, calcitonin salmon) Pt with history of COPD for which he follows with a pulmonologist in [place] (Dr Bird at [facility])  - C/w home medications (advair, ellipta, calcitonin salmon, prednisone 20 mg daily) Pt with history of COPD for which he follows with a pulmonologist in [place] (Dr Bird at [facility]). On advair, ellipta, calcitonin salmon, prednisone 20 mg daily at home  - C/w advair, ellipta, prednisone Pt with history of 9 back surgeries and b/l foot surgeries after traumatic accident in 1991. On extensive pain medication regimen. Given dilaudid 4mg in ED for back pain.  - C/w home meds: Lyrica 50mg BID, Dilaudid 4mg TID prn for severe pain, clonazepam 1.5mg daily prn, hydroxyzine 100mg TID standing, [id number] BID prn; diazepam 5mg TID prn  - Obtain collateral from PCP Pt with history of 9 back surgeries and b/l foot surgeries after traumatic accident in 1991. On extensive pain medication regimen. Given dilaudid 4mg in ED for back pain.  - C/w home meds: Lyrica 50mg BID, Dilaudid 4mg TID prn for severe pain, clonazepam 1.5mg daily prn, hydroxyzine 100mg TID standing, [id number] BID prn; diazepam 5mg TID prn  - Obtain collateral from PCP    ADDENDUM: pt reports being on morphine sulfate ER 30mg ; seen on outside medication sources;  ordered medication , confirm w/ pharmacy or PCP

## 2019-02-05 NOTE — ED PROVIDER NOTE - OBJECTIVE STATEMENT
62 y/o M w/PMH of COPD, PE/DVT, TBI s/p multiple spinal surgeries (wheelchair bound can walk about 50 ft) presenting with fever x1 day. He has been staying in [facility] during his wife's admission and noticed fevers and chills last night. This morning he was sent down to the ED with a fever of 100.3 and minor cough. Denies sore throat, rhinorrhea, headache, chest pain, or SOB. Pt self catheterizes and has not noticed any abdominal pain, pain or burning w/urination. Endorses fatigue and decreased appetite x1 week, denies n/v, diarrhea or constipation. Worked up in [facility] last week (1/29) for swollen L ankle d/c w/leg edema. Today ankle pain is 1/10, endorses some numbness/tingling but doesn't seem to bother him, pt has full range of motion.

## 2019-02-05 NOTE — ED ADULT NURSE NOTE - OBJECTIVE STATEMENT
pt came in for fever, denies pain no c/p or sob no acute distress pt came in for fever, denies pain no c/p or sob no acute distress labs sent tylenol given for low grade temp continue to observe

## 2019-02-05 NOTE — H&P ADULT - PROBLEM SELECTOR PLAN 9
1) PCP Contacted on Admission: (Y/N) --> Name & Phone #: Dr Sushma Cottrell (PCP) [phone] ([place], [place])  2) Date of Contact with PCP:  3) PCP Contacted at Discharge: (Y/N, N/A)  4) Summary of Handoff Given to PCP:   5) Post-Discharge Appointment Date and Location: DVT PPX: HSQ, SCDs DVT PPX: HSQ F: No IVF  E: Replete to maintain K>4 and Mg>2  N: Regular diet    #PPX: HSQ

## 2019-02-05 NOTE — H&P ADULT - ASSESSMENT
63M with PMH of TBI and multiple spinal/orthopedic surgeries due to an agricultural accident in 1991 with resultant chronic pain, neurogenic bladder (requiring self catheterization 4x/day), neurogenic spasticity, COPD, DVT/PE, central and obstructive sleep apnea, esophageal dysplasia (grade 4), Charcot Delia Tooth, depression, insomnia presenting with 2 days history of fever and chills, admitted with SIRS of unclear source.

## 2019-02-05 NOTE — ED ADULT NURSE NOTE - NSIMPLEMENTINTERV_GEN_ALL_ED
Implemented All Universal Safety Interventions:  [place] to call system. Call bell, personal items and telephone within reach. Instruct patient to call for assistance. Room bathroom lighting operational. Non-slip footwear when patient is off stretcher. Physically safe environment: no spills, clutter or unnecessary equipment. Stretcher in lowest position, wheels locked, appropriate side rails in place.

## 2019-02-05 NOTE — H&P ADULT - PROBLEM SELECTOR PLAN 1
Pt presenting with SIRS criteria Tm 100.6, tachycardia 98 of unclear source. UA negative, CXR without acute infiltrate. RVP negative. No other localizing infectious symptoms. Pt with LLE swelling however not erythematous, tender or warm.   - Will hold off on abx given no clear source at this time  - F/u BCx x2 sets to rule out occult bacteremia    #LLE swelling  - Pt with LLE pitting edema to ankle on exam. Had b/l LE dopplers on 1/29 on ED visit negative for DVT and per pt edema is reduced since this time, currently not erythematous, warm or tender on exam. May be dependent edema in setting of pt sleeping in his wheelchair x several days  - Elevation of LLE  - Continue to monitor Pt presenting with SIRS criteria Tm 100.6, tachycardia 98 of unclear source. UA negative, CXR without acute infiltrate. RVP negative. No other localizing infectious symptoms. Pt with LLE swelling however not erythematous, tender or warm. Per pt's wife, pt has history of aspiration PNA ~4 years ago, low suspicion for aspiration at this time.  - Will hold off on abx given no clear source at this time  - F/u BCx x2 sets to rule out occult bacteremia  - F/u Ucx  - Consider gallium scan to evaluate further for source of infxn    #LLE swelling  - Pt with LLE pitting edema to ankle on exam. Had b/l LE dopplers on 1/29 on ED visit negative for DVT and per pt edema is reduced since this time, currently not erythematous, warm or tender on exam. May be dependent edema in setting of pt sleeping in his wheelchair x several days  - Elevation of LLE  - Continue to monitor  - F/u D-dimer  - Consider LLE doppler vs CT PE protocol pending D-dimer  - Monitor respiratory status (currently without tachypnea with O2 sats 92-97% on RA). Pt presenting with SIRS criteria Tm 100.6, tachycardia 98 of unclear source. UA negative, CXR without acute infiltrate. RVP negative. No other localizing infectious symptoms. Pt with LLE swelling however not erythematous, tender or warm. Per pt's wife, pt has history of aspiration PNA ~4 years ago, low suspicion for aspiration at this time.  - Will hold off on abx given no clear source at this time  - F/u BCx x2 sets to rule out occult bacteremia  - F/u Ucx  - Consider gallium scan to evaluate further for source of infxn

## 2019-02-05 NOTE — H&P ADULT - PROBLEM SELECTOR PROBLEM 7
Nutrition, metabolism, and development symptoms Depression, unspecified depression type Anemia, unspecified type

## 2019-02-05 NOTE — H&P ADULT - PROBLEM SELECTOR PLAN 4
Pt states he has history of both central and obstructive sleep apnea for which he uses a specialized CPAP machine at home. While in the hospital the mask that goes with his machine was thrown away.  - Obtain mask for CPAP machine  - CPAP at night Pt+ wife reports history of DVT/PE that was perioperative in 1991 for which he was previously on AC. Currently not on any AC  - F/u D-dimer in setting of LLE swelling  - Consider repeat LLE doppler vs CT PE protocol pending d-dimer Pt with history of COPD for which he follows with a pulmonologist in [place] (Dr Bird at [facility]). On advair, ellipta, nasal flonase, prednisone 20 mg daily at home  - C/w advair, ellipta, prednisone Pt with history of COPD for which he follows with a pulmonologist in [place] (Dr Bird at [facility]). On advair, ellipta, nasal flonase, prednisone 20 mg daily at home  - C/w advair, ellipta, prednisone    ADDENDUM: pt was on bactrim ds 2 weeks ago during a COPD exacerbation; pt off bactrim ds currently, is not experiencing COPD exacerbation sxs.

## 2019-02-05 NOTE — ED ADULT NURSE NOTE - GASTROINTESTINAL WDL
Subjective:       Mandeep Guy is a 85 y.o. male who is an established patient who was referred by Hannah Connell for evaluation of scrotal swelling, though he says he is here for urinary frequency.    He reports frequent urination as well as urgency and UUI. He is on no BPH medications. Nocturia x 3-4. Stream is variable. Occasional hesitancy. Denies hematuria, UTI. He reports a procedure many years ago at [facility] - maybe a TURP? He is wearing diapers.     He reported to PCP b/l scrotal swelling. He is s/p b/l lap inguinal hernia repair 10/17 by Dr Ashby (general surgery, not a urologist as listed in PCP notes). Last saw Dr Ashby 11/17 and was doing well. Scrotal swelling fluctuates in size - seems to get bigger as day progresses. Swelling is worse since the hernia surgery.     Again reports change in size of scrotum throughout day. He is not interested in surgery.      He was taking Flomax, still with nocturia x 1-3. Constipation worsens LUTS. He self-stopped Flomax.     PVR (bladder scan) initial visit - 0cc, 0cc today    BOBBY (at [facility]) 6/18 - b/l moderate complex hydroceles (no size given), b/l epididymal head cysts (R 1cm, L 6mm).       The following portions of the patient's history were reviewed and updated as appropriate: allergies, current medications, past family history, past medical history, past social history, past surgical history and problem list.    Review of Systems  Constitutional: no fever or chills  ENT: no nasal congestion or sore throat  Respiratory: no cough or shortness of breath  Cardiovascular: no chest pain or palpitations  Gastrointestinal: no nausea or vomiting, tolerating diet  Genitourinary: as per HPI  Hematologic/Lymphatic: no easy bruising or lymphadenopathy  Musculoskeletal: no arthralgias or myalgias  Skin: no rashes or lesions  Neurological: no seizures or tremors  Behavioral/Psych: no auditory or visual hallucinations       Objective:    Vitals: /60   Pulse 68   Resp  "14   Ht 5' 8" (1.727 m)   Wt 83.3 kg (183 lb 8.5 oz)   BMI 27.91 kg/m²     Physical Exam   General: well developed, well nourished in no acute distress  Head: normocephalic, atraumatic  Neck: supple, trachea midline, no obvious enlargement of thyroid  HEENT: EOMI, mucus membranes moist, sclera anicteric, no hearing impairment  Lungs: symmetric expansion, non-labored breathing  Cardiovascular: regular rate and rhythm, normal pulses  Abdomen: soft, non tender, non distended, no palpable masses, no hepatosplenomegaly, no hernias, bladder nonpalpable. No CVA tenderness.  Musculoskeletal: no peripheral edema, normal ROM in bilateral upper and lower extremities  Lymphatics: no cervical or inguinal lymphadenopathy  Skin: no rashes or lesions  Neuro: alert and oriented x 3, no gross deficits  Psych: normal judgment and insight, normal mood/affect and non-anxious  Genitourinary: (last visit)   Penis -  circumcised penis without plaques, lesions, or scarring.  Urethra - orthotopic location without stenosis.  Scrotum  - no lesions or rashes, bilateral scrotal swelling, likely.  Testes - descended bilaterally, symmetric without masses, non tender.  Epididymides - no cysts or lesions, no spermatocele, symmetric   RONDA: patient declined exam      Lab Review   Urine analysis today in clinic shows no urine given     Lab Results   Component Value Date    WBC 11.86 10/16/2017    HGB 11.5 (L) 10/16/2017    HCT 35 (L) 10/16/2017    MCV 93 10/16/2017     10/16/2017     Lab Results   Component Value Date    CREATININE 1.7 (H) 10/17/2017    BUN 23 10/17/2017     No results found for: PSA  No results found for: PSADIAG    Imaging  NA       Assessment/Plan:      1. Frequency of urination    - Flomax   - ?ACh     2. BPH with obstruction/lower urinary tract symptoms    - Flomax - self-stopped   - ?Prior TURP     3. Nocturia    - Flomax - self-stopped   - Reduce PM fluids     4. Urge incontinence    - Flomax - self-stopped   - ?ACh - " declines further meds right now     5. Bilateral hydrocele    - BOBBY - results above   - He is not inclined for surgical repair   - May be communicating as he notes change in size daily       Follow up in 6 months for scrotal exam        Abdomen soft, nontender, nondistended, bowel sounds present in all 4 quadrants.

## 2019-02-06 DIAGNOSIS — L03.116 CELLULITIS OF LEFT LOWER LIMB: ICD-10-CM

## 2019-02-06 LAB
ANION GAP SERPL CALC-SCNC: 12 MMOL/L — SIGNIFICANT CHANGE UP (ref 5–17)
BUN SERPL-MCNC: 17 MG/DL — SIGNIFICANT CHANGE UP (ref 7–23)
CALCIUM SERPL-MCNC: 9 MG/DL — SIGNIFICANT CHANGE UP (ref 8.4–10.5)
CHLORIDE SERPL-SCNC: 108 MMOL/L — SIGNIFICANT CHANGE UP (ref 96–108)
CO2 SERPL-SCNC: 24 MMOL/L — SIGNIFICANT CHANGE UP (ref 22–31)
CREAT SERPL-MCNC: 0.88 MG/DL — SIGNIFICANT CHANGE UP (ref 0.5–1.3)
CRP SERPL-MCNC: 3.77 MG/DL — HIGH (ref 0–0.4)
CULTURE RESULTS: SIGNIFICANT CHANGE UP
ERYTHROCYTE [SEDIMENTATION RATE] IN BLOOD: 8 MM/HR — SIGNIFICANT CHANGE UP
FERRITIN SERPL-MCNC: 25 NG/ML — LOW (ref 30–400)
GLUCOSE SERPL-MCNC: 97 MG/DL — SIGNIFICANT CHANGE UP (ref 70–99)
HCT VFR BLD CALC: 37.2 % — LOW (ref 39–50)
HGB BLD-MCNC: 10.8 G/DL — LOW (ref 13–17)
IRON SATN MFR SERPL: 14 UG/DL — LOW (ref 45–165)
IRON SATN MFR SERPL: 5 % — LOW (ref 16–55)
MAGNESIUM SERPL-MCNC: 1.8 MG/DL — SIGNIFICANT CHANGE UP (ref 1.6–2.6)
MCHC RBC-ENTMCNC: 21.7 PG — LOW (ref 27–34)
MCHC RBC-ENTMCNC: 29 GM/DL — LOW (ref 32–36)
MCV RBC AUTO: 74.8 FL — LOW (ref 80–100)
NRBC # BLD: 0 /100 WBCS — SIGNIFICANT CHANGE UP (ref 0–0)
PLATELET # BLD AUTO: 170 K/UL — SIGNIFICANT CHANGE UP (ref 150–400)
POTASSIUM SERPL-MCNC: 4 MMOL/L — SIGNIFICANT CHANGE UP (ref 3.5–5.3)
POTASSIUM SERPL-SCNC: 4 MMOL/L — SIGNIFICANT CHANGE UP (ref 3.5–5.3)
RBC # BLD: 4.97 M/UL — SIGNIFICANT CHANGE UP (ref 4.2–5.8)
RBC # FLD: 20.1 % — HIGH (ref 10.3–14.5)
SODIUM SERPL-SCNC: 144 MMOL/L — SIGNIFICANT CHANGE UP (ref 135–145)
SPECIMEN SOURCE: SIGNIFICANT CHANGE UP
TIBC SERPL-MCNC: 309 UG/DL — SIGNIFICANT CHANGE UP (ref 220–430)
UIBC SERPL-MCNC: 295 UG/DL — SIGNIFICANT CHANGE UP (ref 110–370)
WBC # BLD: 5.57 K/UL — SIGNIFICANT CHANGE UP (ref 3.8–10.5)
WBC # FLD AUTO: 5.57 K/UL — SIGNIFICANT CHANGE UP (ref 3.8–10.5)

## 2019-02-06 PROCEDURE — 93971 EXTREMITY STUDY: CPT | Mod: 26,LT

## 2019-02-06 PROCEDURE — 99233 SBSQ HOSP IP/OBS HIGH 50: CPT

## 2019-02-06 RX ORDER — CLONAZEPAM 1 MG
1 TABLET ORAL ONCE
Qty: 0 | Refills: 0 | Status: DISCONTINUED | OUTPATIENT
Start: 2019-02-06 | End: 2019-02-06

## 2019-02-06 RX ORDER — DRONABINOL 2.5 MG
2 CAPSULE ORAL
Qty: 0 | Refills: 0 | COMMUNITY

## 2019-02-06 RX ORDER — TRAZODONE HCL 50 MG
100 TABLET ORAL
Qty: 0 | Refills: 0 | COMMUNITY

## 2019-02-06 RX ORDER — FERROUS SULFATE 325(65) MG
325 TABLET ORAL DAILY
Qty: 0 | Refills: 0 | Status: DISCONTINUED | OUTPATIENT
Start: 2019-02-06 | End: 2019-02-07

## 2019-02-06 RX ORDER — HYDROMORPHONE HYDROCHLORIDE 2 MG/ML
1 INJECTION INTRAMUSCULAR; INTRAVENOUS; SUBCUTANEOUS
Qty: 0 | Refills: 0 | COMMUNITY

## 2019-02-06 RX ORDER — DOCUSATE SODIUM 100 MG
100 CAPSULE ORAL THREE TIMES A DAY
Qty: 0 | Refills: 0 | Status: DISCONTINUED | OUTPATIENT
Start: 2019-02-06 | End: 2019-02-07

## 2019-02-06 RX ORDER — HYDROXYZINE HCL 10 MG
1 TABLET ORAL
Qty: 0 | Refills: 0 | COMMUNITY

## 2019-02-06 RX ORDER — DEXLANSOPRAZOLE 30 MG/1
1 CAPSULE, DELAYED RELEASE ORAL
Qty: 0 | Refills: 0 | COMMUNITY

## 2019-02-06 RX ORDER — PYRIDOSTIGMINE BROMIDE 60 MG/5ML
1 SOLUTION ORAL
Qty: 0 | Refills: 0 | COMMUNITY

## 2019-02-06 RX ORDER — FINASTERIDE 5 MG/1
5 TABLET, FILM COATED ORAL DAILY
Qty: 0 | Refills: 0 | Status: DISCONTINUED | OUTPATIENT
Start: 2019-02-06 | End: 2019-02-07

## 2019-02-06 RX ORDER — FINASTERIDE 5 MG/1
1 TABLET, FILM COATED ORAL
Qty: 0 | Refills: 0 | COMMUNITY

## 2019-02-06 RX ORDER — CEPHALEXIN 500 MG
500 CAPSULE ORAL EVERY 6 HOURS
Qty: 0 | Refills: 0 | Status: DISCONTINUED | OUTPATIENT
Start: 2019-02-06 | End: 2019-02-07

## 2019-02-06 RX ORDER — CLONAZEPAM 1 MG
1.5 TABLET ORAL
Qty: 0 | Refills: 0 | COMMUNITY

## 2019-02-06 RX ORDER — DIAZEPAM 5 MG
1 TABLET ORAL
Qty: 0 | Refills: 0 | COMMUNITY

## 2019-02-06 RX ORDER — FLUTICASONE FUROATE AND VILANTEROL TRIFENATATE 100; 25 UG/1; UG/1
1 POWDER RESPIRATORY (INHALATION)
Qty: 0 | Refills: 0 | COMMUNITY

## 2019-02-06 RX ORDER — HYDROXYZINE HCL 10 MG
100 TABLET ORAL
Qty: 0 | Refills: 0 | COMMUNITY

## 2019-02-06 RX ADMIN — Medication 100 MILLIGRAM(S): at 22:16

## 2019-02-06 RX ADMIN — Medication 1 TABLET(S): at 22:20

## 2019-02-06 RX ADMIN — PYRIDOSTIGMINE BROMIDE 60 MILLIGRAM(S): 60 SOLUTION ORAL at 14:33

## 2019-02-06 RX ADMIN — HEPARIN SODIUM 5000 UNIT(S): 5000 INJECTION INTRAVENOUS; SUBCUTANEOUS at 06:58

## 2019-02-06 RX ADMIN — MORPHINE SULFATE 30 MILLIGRAM(S): 50 CAPSULE, EXTENDED RELEASE ORAL at 17:15

## 2019-02-06 RX ADMIN — HYDROMORPHONE HYDROCHLORIDE 4 MILLIGRAM(S): 2 INJECTION INTRAMUSCULAR; INTRAVENOUS; SUBCUTANEOUS at 23:20

## 2019-02-06 RX ADMIN — MORPHINE SULFATE 30 MILLIGRAM(S): 50 CAPSULE, EXTENDED RELEASE ORAL at 07:03

## 2019-02-06 RX ADMIN — Medication 325 MILLIGRAM(S): at 15:57

## 2019-02-06 RX ADMIN — PYRIDOSTIGMINE BROMIDE 60 MILLIGRAM(S): 60 SOLUTION ORAL at 06:59

## 2019-02-06 RX ADMIN — HEPARIN SODIUM 5000 UNIT(S): 5000 INJECTION INTRAVENOUS; SUBCUTANEOUS at 14:33

## 2019-02-06 RX ADMIN — Medication 500 MILLIGRAM(S): at 11:13

## 2019-02-06 RX ADMIN — Medication 500 MILLIGRAM(S): at 17:15

## 2019-02-06 RX ADMIN — Medication 1.5 MILLIGRAM(S): at 10:07

## 2019-02-06 RX ADMIN — Medication 1 MILLIGRAM(S): at 15:57

## 2019-02-06 RX ADMIN — BUDESONIDE AND FORMOTEROL FUMARATE DIHYDRATE 2 PUFF(S): 160; 4.5 AEROSOL RESPIRATORY (INHALATION) at 17:16

## 2019-02-06 RX ADMIN — HEPARIN SODIUM 5000 UNIT(S): 5000 INJECTION INTRAVENOUS; SUBCUTANEOUS at 22:15

## 2019-02-06 RX ADMIN — Medication 100 MILLIGRAM(S): at 14:33

## 2019-02-06 RX ADMIN — MORPHINE SULFATE 30 MILLIGRAM(S): 50 CAPSULE, EXTENDED RELEASE ORAL at 19:24

## 2019-02-06 RX ADMIN — Medication 75 MILLIGRAM(S): at 23:54

## 2019-02-06 RX ADMIN — PANTOPRAZOLE SODIUM 40 MILLIGRAM(S): 20 TABLET, DELAYED RELEASE ORAL at 22:16

## 2019-02-06 RX ADMIN — BUDESONIDE AND FORMOTEROL FUMARATE DIHYDRATE 2 PUFF(S): 160; 4.5 AEROSOL RESPIRATORY (INHALATION) at 07:04

## 2019-02-06 RX ADMIN — Medication 20 MILLIGRAM(S): at 06:59

## 2019-02-06 RX ADMIN — Medication 50 MILLIGRAM(S): at 07:03

## 2019-02-06 RX ADMIN — Medication 75 MILLIGRAM(S): at 17:15

## 2019-02-06 RX ADMIN — Medication 100 MILLIGRAM(S): at 06:58

## 2019-02-06 RX ADMIN — HYDROMORPHONE HYDROCHLORIDE 4 MILLIGRAM(S): 2 INJECTION INTRAMUSCULAR; INTRAVENOUS; SUBCUTANEOUS at 10:07

## 2019-02-06 RX ADMIN — Medication 1 TABLET(S): at 10:07

## 2019-02-06 RX ADMIN — HYDROMORPHONE HYDROCHLORIDE 4 MILLIGRAM(S): 2 INJECTION INTRAMUSCULAR; INTRAVENOUS; SUBCUTANEOUS at 22:20

## 2019-02-06 RX ADMIN — FINASTERIDE 5 MILLIGRAM(S): 5 TABLET, FILM COATED ORAL at 17:15

## 2019-02-06 RX ADMIN — Medication 500 MILLIGRAM(S): at 23:53

## 2019-02-06 RX ADMIN — Medication 1 SPRAY(S): at 06:58

## 2019-02-06 RX ADMIN — HYDROMORPHONE HYDROCHLORIDE 4 MILLIGRAM(S): 2 INJECTION INTRAMUSCULAR; INTRAVENOUS; SUBCUTANEOUS at 11:09

## 2019-02-06 RX ADMIN — PYRIDOSTIGMINE BROMIDE 60 MILLIGRAM(S): 60 SOLUTION ORAL at 22:16

## 2019-02-06 RX ADMIN — Medication 5 MILLIGRAM(S): at 15:57

## 2019-02-06 RX ADMIN — Medication 1 SPRAY(S): at 17:16

## 2019-02-06 RX ADMIN — Medication 100 MILLIGRAM(S): at 22:15

## 2019-02-06 NOTE — PROGRESS NOTE ADULT - SUBJECTIVE AND OBJECTIVE BOX
OVERNIGHT EVENTS: No acute events overnight.     SUBJECTIVE / INTERVAL HPI: Patient seen and examined at bedside. He notes that he did continue to feel feverish overnight, though recorded temperatures were afebrile. He otherwise noted he did feel slightly SOB when talking earlier today; he denied CP, n/v/d, abd pain, leg pain/swelling. He did however note that the feeling in his feet, including on his left foot where erythematous swollen area is, is reduced at baseline.     VITAL SIGNS:  Vital Signs Last 24 Hrs  T(C): 36.8 (2019 07:43), Max: 39.8 (2019 12:31)  T(F): 98.2 (2019 07:43), Max: 103.7 (2019 12:31)  HR: 64 (:43) (63 - 98)  BP: 127/67 (2019 07:43) (119/68 - 149/74)  BP(mean): --  RR: 20 (:43) (18 - 20)  SpO2: 97% (2019 07:43) (92% - 97%)    19 @ 07:01  -  19 @ 07:00  --------------------------------------------------------  IN: 0 mL / OUT: 800 mL / NET: -800 mL    PHYSICAL EXAM:  General: WDWN, no acute respiratory distress  HEENT: NC/AT; PERRL, anicteric sclera; MMM  Neck: supple, no JVD  Cardiovascular: +S1/S2, RRR, no m/r/g  Respiratory: CTA B/L; no W/R/R  Gastrointestinal: soft, NT/ND; normal BS in all 4 quadrants  Extremities: WWP. +swollen, erythematous, warm area on L dorsal foot, nontender. Pen line drawn around border. No cyanosis or edema otherwise.   Vascular: 2+ radial, DP/PT pulses B/L  Neurological: AAOx3; 5/5 strength in all 4 extremities. Noted to be mildly tremulous in face and arms.   Lines: peripheral IV x 2. No erythema/warmth/pus appreciated.    MEDICATIONS:  MEDICATIONS  (STANDING):  buDESOnide  80 MICROgram(s)/formoterol 4.5 MICROgram(s) Inhaler 2 Puff(s) Inhalation two times a day  cephalexin 500 milliGRAM(s) Oral every 6 hours  docusate sodium 100 milliGRAM(s) Oral three times a day  fluticasone propionate 50 MICROgram(s)/spray Nasal Spray 1 Spray(s) Both Nostrils two times a day  heparin  Injectable 5000 Unit(s) SubCutaneous every 8 hours  hydrOXYzine hydrochloride 100 milliGRAM(s) Oral three times a day  Incruse (Ellipta) inhalation powder 1 Inhalation 1 Inhalation Oral daily  morphine ER Tablet 30 milliGRAM(s) Oral every 12 hours  pantoprazole    Tablet 40 milliGRAM(s) Oral at bedtime  predniSONE   Tablet 20 milliGRAM(s) Oral daily  pregabalin 50 milliGRAM(s) Oral two times a day  pyridostigmine 60 milliGRAM(s) Oral three times a day  traZODone 100 milliGRAM(s) Oral at bedtime  trimethoprim  160 mG/sulfamethoxazole 800 mG 1 Tablet(s) Oral every 12 hours    MEDICATIONS  (PRN):  clonazePAM Tablet 1.5 milliGRAM(s) Oral daily PRN spasticity - 1st line  diazepam    Tablet 5 milliGRAM(s) Oral three times a day PRN spasticity - 2nd line  HYDROmorphone   Tablet 4 milliGRAM(s) Oral every 6 hours PRN Moderate Pain (4 - 6)  hydrOXYzine hydrochloride 25 milliGRAM(s) Oral two times a day PRN spasticity - 3rd line    ALLERGIES:  Allergies    Elavil (Other)  Flexeril (Other)    LABS:                        10.8   5.57  )-----------( 170      ( 2019 07:00 )             37.2     02-06    144  |  108  |  17  ----------------------------<  97  4.0   |  24  |  0.88    Ca    9.0      2019 06:59  Mg     1.8     02-06    TPro  7.3  /  Alb  4.1  /  TBili  0.2  /  DBili  x   /  AST  25  /  ALT  18  /  AlkPhos  72  02-    Urinalysis Basic - ( 2019 16:11 )    Color: Yellow / Appearance: Clear / S.020 / pH: x  Gluc: x / Ketone: NEGATIVE  / Bili: Negative / Urobili: 0.2 E.U./dL   Blood: x / Protein: NEGATIVE mg/dL / Nitrite: NEGATIVE   Leuk Esterase: NEGATIVE / RBC: x / WBC x   Sq Epi: x / Non Sq Epi: x / Bacteria: x    Culture - Blood (collected 19 @ 16:09)  Source: .Blood Blood  Preliminary Report (19 @ 05:01):    No growth at 12 hours    Culture - Blood (collected 19 @ 16:09)  Source: .Blood Blood  Preliminary Report (19 @ 05:01):    No growth at 12 hours    RADIOLOGY & ADDITIONAL TESTS: Reviewed.

## 2019-02-06 NOTE — PROGRESS NOTE ADULT - ASSESSMENT
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           #LLE swelling  - Pt with LLE pitting edema to ankle on exam. Had b/l LE dopplers on 1/29 on ED visit negative for DVT and per pt edema is reduced since this time, currently not erythematous, warm or tender on exam. May be dependent edema in setting of pt sleeping in his wheelchair x several days  - Elevation of LLE  - Continue to monitor  - F/u D-dimer  - Consider LLE doppler vs CT PE protocol pending D-dimer  - Monitor respiratory status (currently without tachypnea with O2 sats 92-97% on RA).    Pt+ wife reports history of DVT/PE that was perioperative in 1991 for which he was previously on AC. Currently not on any AC  - F/u D-dimer in setting of LLE swelling  - Consider repeat LLE doppler vs CT PE protocol pending d-dimer    ADDENDUM: ordered xrays of left ankle and foot; check ESRP/ CRP 63M with PMH of TBI and multiple spinal/orthopedic surgeries due to an agricultural accident in 1991 with resultant chronic pain, neurogenic bladder (requiring self catheterization 4x/day), neurogenic spasticity, COPD, DVT/PE, central and obstructive sleep apnea, esophageal dysplasia (grade 4), Charcot Delia Tooth, depression, insomnia presenting with 2 days history of fever and chills and found to have L foot erythema and swelling on exam, consistent with sepsis 2/2 L foot cellulitis.

## 2019-02-06 NOTE — PROGRESS NOTE ADULT - PROBLEM SELECTOR PLAN 5
microcytic; monitor CBC, check iron studies; followup w/ outpatient GI microcytic anemia, has iron deficiency on labs.   - ferrous sulfate 1 mg daily  - followup w/ outpatient GI

## 2019-02-06 NOTE — PROGRESS NOTE ADULT - PROBLEM SELECTOR PLAN 3
Pt states he has history of both central and obstructive sleep apnea for which he uses a specialized CPAP machine (ASV) at home. While in the hospital the mask that goes with his machine was thrown away.  - Obtain mask for CPAP machine  - CPAP at night

## 2019-02-06 NOTE — PROGRESS NOTE ADULT - PROBLEM SELECTOR PLAN 6
Pt reports history of depression for which he takes [id number] nightly  - C/w trazodone 100mg at night

## 2019-02-06 NOTE — PROGRESS NOTE ADULT - PROBLEM SELECTOR PLAN 4
Pt with hx of neurogenic bladder since his multiple spinal surgeries, he self-catheterizes q6h at home. UA negative.  - C/w straight cath q6h    #Charcot Delia Tooth  Pt with history of neurogenic spasticity and dystonia which developed after his spinal surgeries, undergoing extensive workup at [facility]. Pt takes pyridostigmine 60mg TID, diazepam 5mg 1-2 tabs TID prn, clonopin 1.5 mg daily prn, hydroxyzine 100mg TID standing , hydroxyzine 25mg BID prn    #Esophageal dysplasia  Pt reports hx of esophageal dysplasia for which he undergoes regular endoscopy, on dexilant 60mg daily  - Conversion to protonix Pt with hx of neurogenic bladder since his multiple spinal surgeries, he self-catheterizes q6h at home. UA negative.  - C/w straight cath q6h  - continue home finasteride 5     #Charcot Delia Tooth  Pt with history of neurogenic spasticity and dystonia which developed after his spinal surgeries, undergoing extensive workup at [facility]. Pt takes pyridostigmine 60mg TID, diazepam 5mg 1-2 tabs TID prn, clonopin 1.5 mg daily prn, hydroxyzine 100mg TID standing, hydroxyzine 25mg BID prn    #Esophageal dysplasia  Pt reports hx of esophageal dysplasia for which he undergoes regular endoscopy, on dexilant 60mg daily  - Conversion to protonix    #chronic back pain   - continue home morphine 30 mg bid  - continue home dilaudid 4 mg PO PRN

## 2019-02-06 NOTE — PROVIDER CONTACT NOTE (OTHER) - SITUATION
Patient restless, in pain, in need for intermittent catheterization for urinary retention. Bladder Scan of 0.

## 2019-02-06 NOTE — PROGRESS NOTE ADULT - PROBLEM SELECTOR PLAN 1
Patient with sepsis (Tmax 103, tachycardia to 98) likely 2/2 L foot cellulitis. Urine would be other suspected source given history of neurogenic bladder however UA is negative; otherwise no localizing signs or symptoms and CXR without infiltrate.  - starting Bactrim and Keflex for cellulitis  - monitor response to antibiotics - border outlined on 2/6 AM  - f/u Blood culture, urine culture  - Doppler given elevated D-dimer Patient with sepsis (Tmax 103, tachycardia to 98) likely 2/2 L foot cellulitis. Urine would be other suspected source given history of neurogenic bladder however UA is negative; otherwise no localizing signs or symptoms and CXR without infiltrate. CRP elevated, also pointing to infectious etiology.   - starting Bactrim and Keflex for cellulitis  - monitor response to antibiotics - border outlined on 2/6 AM  - f/u Blood culture, urine culture  - Doppler given elevated D-dimer

## 2019-02-06 NOTE — PROGRESS NOTE ADULT - PROBLEM SELECTOR PLAN 8
1) PCP Contacted on Admission: (N) --> Name & Phone #: Dr Sushma Cottrell (PCP) [phone] ([place], [place])  2) Date of Contact with PCP:  3) PCP Contacted at Discharge: (Y/N, N/A)  4) Summary of Handoff Given to PCP:   5) Post-Discharge Appointment Date and Location: 1) PCP Contacted on Admission: Yes --> Name & Phone #: Dr Sushma Cottrell (PCP) [phone] ([place], [place])  2) Date of Contact with PCP:  3) PCP Contacted at Discharge: (Y/N, N/A)  4) Summary of Handoff Given to PCP:   5) Post-Discharge Appointment Date and Location:
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
## 2019-02-06 NOTE — PROGRESS NOTE ADULT - PROBLEM SELECTOR PLAN 2
Pt with history of COPD for which he follows with a pulmonologist in [place] (Dr Bird at [facility]). On advair, ellipta, nasal flonase, prednisone 20 mg daily at home. No evidence of COPD exacerbation.   - C/w advair, ellipta, prednisone Pt with history of COPD for which he follows with a pulmonologist in [place] (Dr Bird at [facility]). On advair, ellipta, nasal flonase, prednisone 20 mg daily at home since COPD exacerbation 3 months ago. No evidence of COPD exacerbation.   - C/w advair, ellipta, prednisone

## 2019-02-07 VITALS
TEMPERATURE: 97 F | HEART RATE: 72 BPM | SYSTOLIC BLOOD PRESSURE: 91 MMHG | DIASTOLIC BLOOD PRESSURE: 59 MMHG | OXYGEN SATURATION: 94 % | RESPIRATION RATE: 18 BRPM

## 2019-02-07 LAB
ANION GAP SERPL CALC-SCNC: 12 MMOL/L — SIGNIFICANT CHANGE UP (ref 5–17)
BASOPHILS # BLD AUTO: 0.03 K/UL — SIGNIFICANT CHANGE UP (ref 0–0.2)
BASOPHILS NFR BLD AUTO: 0.6 % — SIGNIFICANT CHANGE UP (ref 0–2)
BUN SERPL-MCNC: 18 MG/DL — SIGNIFICANT CHANGE UP (ref 7–23)
CALCIUM SERPL-MCNC: 9.1 MG/DL — SIGNIFICANT CHANGE UP (ref 8.4–10.5)
CHLORIDE SERPL-SCNC: 104 MMOL/L — SIGNIFICANT CHANGE UP (ref 96–108)
CO2 SERPL-SCNC: 21 MMOL/L — LOW (ref 22–31)
CREAT SERPL-MCNC: 0.98 MG/DL — SIGNIFICANT CHANGE UP (ref 0.5–1.3)
EOSINOPHIL # BLD AUTO: 0.01 K/UL — SIGNIFICANT CHANGE UP (ref 0–0.5)
EOSINOPHIL NFR BLD AUTO: 0.2 % — SIGNIFICANT CHANGE UP (ref 0–6)
GLUCOSE SERPL-MCNC: 107 MG/DL — HIGH (ref 70–99)
HCT VFR BLD CALC: 35.7 % — LOW (ref 39–50)
HGB BLD-MCNC: 10.5 G/DL — LOW (ref 13–17)
IMM GRANULOCYTES NFR BLD AUTO: 0.6 % — SIGNIFICANT CHANGE UP (ref 0–1.5)
LYMPHOCYTES # BLD AUTO: 1 K/UL — SIGNIFICANT CHANGE UP (ref 1–3.3)
LYMPHOCYTES # BLD AUTO: 18.8 % — SIGNIFICANT CHANGE UP (ref 13–44)
MAGNESIUM SERPL-MCNC: 1.7 MG/DL — SIGNIFICANT CHANGE UP (ref 1.6–2.6)
MCHC RBC-ENTMCNC: 21.8 PG — LOW (ref 27–34)
MCHC RBC-ENTMCNC: 29.4 GM/DL — LOW (ref 32–36)
MCV RBC AUTO: 74.1 FL — LOW (ref 80–100)
MONOCYTES # BLD AUTO: 0.41 K/UL — SIGNIFICANT CHANGE UP (ref 0–0.9)
MONOCYTES NFR BLD AUTO: 7.7 % — SIGNIFICANT CHANGE UP (ref 2–14)
NEUTROPHILS # BLD AUTO: 3.85 K/UL — SIGNIFICANT CHANGE UP (ref 1.8–7.4)
NEUTROPHILS NFR BLD AUTO: 72.1 % — SIGNIFICANT CHANGE UP (ref 43–77)
PLATELET # BLD AUTO: 160 K/UL — SIGNIFICANT CHANGE UP (ref 150–400)
POTASSIUM SERPL-MCNC: 4.4 MMOL/L — SIGNIFICANT CHANGE UP (ref 3.5–5.3)
POTASSIUM SERPL-SCNC: 4.4 MMOL/L — SIGNIFICANT CHANGE UP (ref 3.5–5.3)
RBC # BLD: 4.82 M/UL — SIGNIFICANT CHANGE UP (ref 4.2–5.8)
RBC # FLD: 19.8 % — HIGH (ref 10.3–14.5)
SODIUM SERPL-SCNC: 137 MMOL/L — SIGNIFICANT CHANGE UP (ref 135–145)
WBC # BLD: 5.33 K/UL — SIGNIFICANT CHANGE UP (ref 3.8–10.5)
WBC # FLD AUTO: 5.33 K/UL — SIGNIFICANT CHANGE UP (ref 3.8–10.5)

## 2019-02-07 PROCEDURE — 99239 HOSP IP/OBS DSCHRG MGMT >30: CPT

## 2019-02-07 RX ORDER — ACETAMINOPHEN 500 MG
650 TABLET ORAL ONCE
Qty: 0 | Refills: 0 | Status: COMPLETED | OUTPATIENT
Start: 2019-02-07 | End: 2019-02-07

## 2019-02-07 RX ORDER — MORPHINE SULFATE 50 MG/1
1 CAPSULE, EXTENDED RELEASE ORAL
Qty: 0 | Refills: 0 | COMMUNITY
Start: 2019-02-07

## 2019-02-07 RX ORDER — FERROUS SULFATE 325(65) MG
1 TABLET ORAL
Qty: 90 | Refills: 0 | OUTPATIENT
Start: 2019-02-07 | End: 2019-03-08

## 2019-02-07 RX ORDER — DOCUSATE SODIUM 100 MG
1 CAPSULE ORAL
Qty: 90 | Refills: 0 | OUTPATIENT
Start: 2019-02-07 | End: 2019-03-08

## 2019-02-07 RX ORDER — CEPHALEXIN 500 MG
1 CAPSULE ORAL
Qty: 24 | Refills: 0 | OUTPATIENT
Start: 2019-02-07 | End: 2019-02-12

## 2019-02-07 RX ORDER — NICOTINE POLACRILEX 2 MG
1 GUM BUCCAL DAILY
Qty: 0 | Refills: 0 | Status: DISCONTINUED | OUTPATIENT
Start: 2019-02-07 | End: 2019-02-07

## 2019-02-07 RX ORDER — NICOTINE POLACRILEX 2 MG
1 GUM BUCCAL
Qty: 0 | Refills: 0 | Status: DISCONTINUED | OUTPATIENT
Start: 2019-02-07 | End: 2019-02-07

## 2019-02-07 RX ORDER — SODIUM CHLORIDE 9 MG/ML
1000 INJECTION INTRAMUSCULAR; INTRAVENOUS; SUBCUTANEOUS ONCE
Qty: 0 | Refills: 0 | Status: COMPLETED | OUTPATIENT
Start: 2019-02-07 | End: 2019-02-07

## 2019-02-07 RX ADMIN — Medication 5 MILLIGRAM(S): at 03:04

## 2019-02-07 RX ADMIN — Medication 325 MILLIGRAM(S): at 12:03

## 2019-02-07 RX ADMIN — Medication 100 MILLIGRAM(S): at 05:30

## 2019-02-07 RX ADMIN — HEPARIN SODIUM 5000 UNIT(S): 5000 INJECTION INTRAVENOUS; SUBCUTANEOUS at 05:31

## 2019-02-07 RX ADMIN — SODIUM CHLORIDE 500 MILLILITER(S): 9 INJECTION INTRAMUSCULAR; INTRAVENOUS; SUBCUTANEOUS at 09:08

## 2019-02-07 RX ADMIN — Medication 75 MILLIGRAM(S): at 05:29

## 2019-02-07 RX ADMIN — Medication 500 MILLIGRAM(S): at 05:30

## 2019-02-07 RX ADMIN — HYDROMORPHONE HYDROCHLORIDE 4 MILLIGRAM(S): 2 INJECTION INTRAMUSCULAR; INTRAVENOUS; SUBCUTANEOUS at 06:30

## 2019-02-07 RX ADMIN — Medication 1 TABLET(S): at 05:30

## 2019-02-07 RX ADMIN — PYRIDOSTIGMINE BROMIDE 60 MILLIGRAM(S): 60 SOLUTION ORAL at 13:27

## 2019-02-07 RX ADMIN — Medication 1 PATCH: at 12:05

## 2019-02-07 RX ADMIN — Medication 500 MILLIGRAM(S): at 12:03

## 2019-02-07 RX ADMIN — Medication 75 MILLIGRAM(S): at 12:02

## 2019-02-07 RX ADMIN — Medication 650 MILLIGRAM(S): at 07:30

## 2019-02-07 RX ADMIN — Medication 1.5 MILLIGRAM(S): at 03:41

## 2019-02-07 RX ADMIN — MORPHINE SULFATE 30 MILLIGRAM(S): 50 CAPSULE, EXTENDED RELEASE ORAL at 05:30

## 2019-02-07 RX ADMIN — Medication 20 MILLIGRAM(S): at 05:30

## 2019-02-07 RX ADMIN — Medication 1 SPRAY(S): at 05:29

## 2019-02-07 RX ADMIN — FINASTERIDE 5 MILLIGRAM(S): 5 TABLET, FILM COATED ORAL at 12:03

## 2019-02-07 RX ADMIN — PYRIDOSTIGMINE BROMIDE 60 MILLIGRAM(S): 60 SOLUTION ORAL at 05:30

## 2019-02-07 RX ADMIN — HYDROMORPHONE HYDROCHLORIDE 4 MILLIGRAM(S): 2 INJECTION INTRAMUSCULAR; INTRAVENOUS; SUBCUTANEOUS at 05:30

## 2019-02-07 RX ADMIN — Medication 100 MILLIGRAM(S): at 13:27

## 2019-02-07 RX ADMIN — BUDESONIDE AND FORMOTEROL FUMARATE DIHYDRATE 2 PUFF(S): 160; 4.5 AEROSOL RESPIRATORY (INHALATION) at 05:28

## 2019-02-07 RX ADMIN — Medication 650 MILLIGRAM(S): at 06:30

## 2019-02-07 NOTE — DISCHARGE NOTE ADULT - SECONDARY DIAGNOSIS.
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

## 2019-02-07 NOTE — DISCHARGE NOTE ADULT - PROVIDER TOKENS
FREE:[LAST:[Simba],FIRST:[Dianaa],PHONE:[phone]],FAX:[(   )    -],ADDRESS:[[address] [place] [id number]],FOLLOWUP:[1 week]]

## 2019-02-07 NOTE — DISCHARGE NOTE ADULT - PLAN OF CARE
Take antibiotics as prescribed You were found to have sepsis, which is a systemic infection, due to cellulitis of the left foot. Please continue on the antibiotics Bactrim and Keflex as prescribed and follow up with your PMD in 1 week.     If you have high fevers, or worsening foot redness, swelling, or pain, please seek medical care earlier as these may be signs of worsening cellulitis. Please continue on your home medications and follow up with your pain specialist for further management. Please continue self catheterizing every 6 hours for your neurogenic bladder. Please continue on your home medications for Charcot Delia Tooth muscular atrophy. You were found to have sepsis, which is a systemic infection, due to cellulitis of the left foot. Please continue on the antibiotics Bactrim and Keflex as prescribed and follow up with your PMD in 1 week.     If you have high fevers or worsening foot redness, swelling, or pain, please seek medical care earlier as these may be signs of worsening cellulitis. You have a slightly low blood count (hemoglobin 10.5) and your labs suggest that you are low on iron. We have prescribed you iron supplements and colace (for constipation given the iron and pain medications). Please follow up with your primary care doctor for further workup and management.

## 2019-02-07 NOTE — DISCHARGE NOTE ADULT - MEDICATION SUMMARY - MEDICATIONS TO TAKE
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    diazePAM 5 mg oral tablet  -- 1 tab(s) by mouth 3 times a day, As Needed spasticity  -- Indication: For Charcot Delia Tooth muscular atrophy    clonazePAM 1 mg oral tablet  -- 1.5 milligram(s) by mouth once a day, As Needed for spasticity  -- Indication: For Charcot Delia Tooth muscular atrophy    Lyrica 50 mg oral capsule  -- 1 cap(s) by mouth 2 times a day, As Needed  -- Indication: For 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    Lyrica 75 mg oral capsule  -- 1  by mouth 4 times a day  -- Indication: For 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    traZODone 100 mg oral tablet  -- 100 milligram(s) by mouth once a day (at bedtime)  -- Indication: For Insomni    dronabinol 5 mg oral capsule  -- 2 cap(s) by mouth 3 times a day  -- Indication: For 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    hydrOXYzine hydrochloride 100 mg oral tablet  -- 100 milligram(s) by mouth 3 times a day  -- Indication: For Charcot Delia Tooth muscular atrophy    hydrOXYzine hydrochloride 25 mg oral tablet  -- 1 tab(s) by mouth 2 times a day, As Needed  -- Indication: For Charcot Delia Tooth muscular atrophy    Breo Ellipta 200 mcg-25 mcg/inh inhalation powder  -- 1 puff(s) inhaled once a day  -- Indication: For COPD    cephalexin 500 mg oral capsule  -- 1 cap(s) by mouth every 6 hours  -- Indication: For Cellulitis of left foot    pyridostigmine 60 mg oral tablet  -- 1 tab(s) by mouth 3 times a day  -- Indication: For Charcot Delia Tooth muscular atrophy    FeroSul 325 mg (65 mg elemental iron) oral tablet  -- 1 tab(s) by mouth 3 times a day  -- Indication: For Iron Deficiency Anemia    docusate sodium 100 mg oral capsule  -- 1 cap(s) by mouth 3 times a day  -- Indication: For Constipation Prevention while on FeroSul    Dexilant 60 mg oral delayed release capsule  -- 1 cap(s) by mouth once a day  -- Indication: For Esophagitis    sulfamethoxazole-trimethoprim 800 mg-160 mg oral tablet  -- 1 tab(s) by mouth every 12 hours  -- Indication: For Cellulitis of left foot

## 2019-02-07 NOTE — DISCHARGE NOTE ADULT - ADDITIONAL INSTRUCTIONS
Please follow up with your primary care doctor Please follow up with your primary care doctor Dr. Cottrell in 1 week.

## 2019-02-07 NOTE — DISCHARGE NOTE ADULT - CARE PLAN
Principal Discharge DX:	Cellulitis of left foot  Goal:	Take antibiotics as prescribed  Assessment and plan of treatment:	You were found to have sepsis, which is a systemic infection, due to cellulitis of the left foot. Please continue on the antibiotics Bactrim and Keflex as prescribed and follow up with your PMD in 1 week.     If you have high fevers, or worsening foot redness, swelling, or pain, please seek medical care earlier as these may be signs of worsening cellulitis.  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  Secondary Diagnosis:	Neurogenic bladder  Assessment and plan of treatment:	Please continue self catheterizing every 6 hours for your neurogenic bladder.  Secondary Diagnosis:	Charcot Delia Tooth muscular atrophy  Assessment and plan of treatment:	Please continue on your home medications for Charcot Delia Tooth muscular atrophy. Principal Discharge DX:	Cellulitis of left foot  Goal:	Take antibiotics as prescribed  Assessment and plan of treatment:	You were found to have sepsis, which is a systemic infection, due to cellulitis of the left foot. Please continue on the antibiotics Bactrim and Keflex as prescribed and follow up with your PMD in 1 week.     If you have high fevers or worsening foot redness, swelling, or pain, please seek medical care earlier as these may be signs of worsening cellulitis.  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  Secondary Diagnosis:	Neurogenic bladder  Assessment and plan of treatment:	Please continue self catheterizing every 6 hours for your neurogenic bladder.  Secondary Diagnosis:	Charcot Delia Tooth muscular atrophy  Assessment and plan of treatment:	Please continue on your home medications for Charcot Delia Tooth muscular atrophy.  Secondary Diagnosis:	Other iron deficiency anemia  Assessment and plan of treatment:	You have a slightly low blood count (hemoglobin 10.5) and your labs suggest that you are low on iron. We have prescribed you iron supplements and colace (for constipation given the iron and pain medications). Please follow up with your primary care doctor for further workup and management.

## 2019-02-07 NOTE — DISCHARGE NOTE ADULT - CARE PROVIDER_API CALL
Leroy Cottrell  [address], [place] [address]  Phone: [phone]  Fax: (   )    -  Follow Up Time: 1 week

## 2019-02-10 LAB
CULTURE RESULTS: SIGNIFICANT CHANGE UP
CULTURE RESULTS: SIGNIFICANT CHANGE UP
SPECIMEN SOURCE: SIGNIFICANT CHANGE UP
SPECIMEN SOURCE: SIGNIFICANT CHANGE UP

## 2019-02-11 DIAGNOSIS — G47.00 INSOMNIA, UNSPECIFIED: ICD-10-CM

## 2019-02-11 DIAGNOSIS — A41.9 SEPSIS, UNSPECIFIED ORGANISM: ICD-10-CM

## 2019-02-11 DIAGNOSIS — N31.9 NEUROMUSCULAR DYSFUNCTION OF BLADDER, UNSPECIFIED: ICD-10-CM

## 2019-02-11 DIAGNOSIS — L03.116 CELLULITIS OF LEFT LOWER LIMB: ICD-10-CM

## 2019-02-11 DIAGNOSIS — J44.9 CHRONIC OBSTRUCTIVE PULMONARY DISEASE, UNSPECIFIED: ICD-10-CM

## 2019-02-11 DIAGNOSIS — D50.9 IRON DEFICIENCY ANEMIA, UNSPECIFIED: ICD-10-CM

## 2019-02-11 DIAGNOSIS — G47.33 OBSTRUCTIVE SLEEP APNEA (ADULT) (PEDIATRIC): ICD-10-CM

## 2019-02-11 DIAGNOSIS — G47.31 PRIMARY CENTRAL SLEEP APNEA: ICD-10-CM

## 2019-02-11 DIAGNOSIS — Z87.820 PERSONAL HISTORY OF TRAUMATIC BRAIN INJURY: ICD-10-CM

## 2019-02-11 DIAGNOSIS — G60.0 HEREDITARY MOTOR AND SENSORY NEUROPATHY: ICD-10-CM

## 2019-02-11 DIAGNOSIS — G89.29 OTHER CHRONIC PAIN: ICD-10-CM

## 2019-02-11 DIAGNOSIS — F32.9 MAJOR DEPRESSIVE DISORDER, SINGLE EPISODE, UNSPECIFIED: ICD-10-CM

## 2019-02-11 DIAGNOSIS — K22.8 OTHER SPECIFIED DISEASES OF ESOPHAGUS: ICD-10-CM

## 2019-02-26 PROCEDURE — 83540 ASSAY OF IRON: CPT

## 2019-02-26 PROCEDURE — 96361 HYDRATE IV INFUSION ADD-ON: CPT

## 2019-02-26 PROCEDURE — 87633 RESP VIRUS 12-25 TARGETS: CPT

## 2019-02-26 PROCEDURE — 85379 FIBRIN DEGRADATION QUANT: CPT

## 2019-02-26 PROCEDURE — 87581 M.PNEUMON DNA AMP PROBE: CPT

## 2019-02-26 PROCEDURE — 83550 IRON BINDING TEST: CPT

## 2019-02-26 PROCEDURE — 80048 BASIC METABOLIC PNL TOTAL CA: CPT

## 2019-02-26 PROCEDURE — 85027 COMPLETE CBC AUTOMATED: CPT

## 2019-02-26 PROCEDURE — 85652 RBC SED RATE AUTOMATED: CPT

## 2019-02-26 PROCEDURE — 96374 THER/PROPH/DIAG INJ IV PUSH: CPT

## 2019-02-26 PROCEDURE — 87086 URINE CULTURE/COLONY COUNT: CPT

## 2019-02-26 PROCEDURE — 80053 COMPREHEN METABOLIC PANEL: CPT

## 2019-02-26 PROCEDURE — 99285 EMERGENCY DEPT VISIT HI MDM: CPT | Mod: 25

## 2019-02-26 PROCEDURE — 85025 COMPLETE CBC W/AUTO DIFF WBC: CPT

## 2019-02-26 PROCEDURE — 93005 ELECTROCARDIOGRAM TRACING: CPT

## 2019-02-26 PROCEDURE — 83605 ASSAY OF LACTIC ACID: CPT

## 2019-02-26 PROCEDURE — 71046 X-RAY EXAM CHEST 2 VIEWS: CPT

## 2019-02-26 PROCEDURE — 73600 X-RAY EXAM OF ANKLE: CPT

## 2019-02-26 PROCEDURE — 81003 URINALYSIS AUTO W/O SCOPE: CPT

## 2019-02-26 PROCEDURE — 73620 X-RAY EXAM OF FOOT: CPT

## 2019-02-26 PROCEDURE — 36415 COLL VENOUS BLD VENIPUNCTURE: CPT

## 2019-02-26 PROCEDURE — 87040 BLOOD CULTURE FOR BACTERIA: CPT

## 2019-02-26 PROCEDURE — 87486 CHLMYD PNEUM DNA AMP PROBE: CPT

## 2019-02-26 PROCEDURE — 83735 ASSAY OF MAGNESIUM: CPT

## 2019-02-26 PROCEDURE — 86140 C-REACTIVE PROTEIN: CPT

## 2019-02-26 PROCEDURE — 94640 AIRWAY INHALATION TREATMENT: CPT

## 2019-02-26 PROCEDURE — 82728 ASSAY OF FERRITIN: CPT

## 2019-02-26 PROCEDURE — 87798 DETECT AGENT NOS DNA AMP: CPT

## 2019-02-26 PROCEDURE — 93971 EXTREMITY STUDY: CPT
